# Patient Record
Sex: MALE | Race: WHITE | Employment: FULL TIME | URBAN - METROPOLITAN AREA
[De-identification: names, ages, dates, MRNs, and addresses within clinical notes are randomized per-mention and may not be internally consistent; named-entity substitution may affect disease eponyms.]

---

## 2017-06-30 ENCOUNTER — TELEPHONE (OUTPATIENT)
Dept: INTERNAL MEDICINE CLINIC | Facility: CLINIC | Age: 62
End: 2017-06-30

## 2017-06-30 DIAGNOSIS — Z13.220 SCREENING FOR LIPOID DISORDERS: ICD-10-CM

## 2017-06-30 DIAGNOSIS — Z13.0 SCREENING FOR BLOOD DISEASE: ICD-10-CM

## 2017-06-30 DIAGNOSIS — Z13.228 SCREENING FOR METABOLIC DISORDER: ICD-10-CM

## 2017-06-30 DIAGNOSIS — Z12.5 SCREENING FOR PROSTATE CANCER: ICD-10-CM

## 2017-06-30 DIAGNOSIS — Z13.29 SCREENING FOR THYROID DISORDER: ICD-10-CM

## 2017-06-30 DIAGNOSIS — Z00.00 ROUTINE GENERAL MEDICAL EXAMINATION AT A HEALTH CARE FACILITY: Primary | ICD-10-CM

## 2017-08-24 ENCOUNTER — LAB ENCOUNTER (OUTPATIENT)
Dept: LAB | Facility: HOSPITAL | Age: 62
End: 2017-08-24
Attending: INTERNAL MEDICINE
Payer: COMMERCIAL

## 2017-08-24 DIAGNOSIS — Z12.5 SCREENING FOR PROSTATE CANCER: ICD-10-CM

## 2017-08-24 DIAGNOSIS — Z00.00 ROUTINE GENERAL MEDICAL EXAMINATION AT A HEALTH CARE FACILITY: ICD-10-CM

## 2017-08-24 DIAGNOSIS — Z13.228 SCREENING FOR METABOLIC DISORDER: ICD-10-CM

## 2017-08-24 DIAGNOSIS — Z13.0 SCREENING FOR BLOOD DISEASE: ICD-10-CM

## 2017-08-24 DIAGNOSIS — Z13.29 SCREENING FOR THYROID DISORDER: ICD-10-CM

## 2017-08-24 DIAGNOSIS — Z13.220 SCREENING FOR LIPOID DISORDERS: ICD-10-CM

## 2017-08-24 LAB
ALBUMIN SERPL-MCNC: 3.7 G/DL (ref 3.5–4.8)
ALP LIVER SERPL-CCNC: 67 U/L (ref 45–117)
ALT SERPL-CCNC: 30 U/L (ref 17–63)
AST SERPL-CCNC: 15 U/L (ref 15–41)
BASOPHILS # BLD AUTO: 0.05 X10(3) UL (ref 0–0.1)
BASOPHILS NFR BLD AUTO: 0.7 %
BILIRUB SERPL-MCNC: 0.7 MG/DL (ref 0.1–2)
BUN BLD-MCNC: 14 MG/DL (ref 8–20)
CALCIUM BLD-MCNC: 8.8 MG/DL (ref 8.3–10.3)
CHLORIDE: 102 MMOL/L (ref 101–111)
CHOLEST SMN-MCNC: 188 MG/DL (ref ?–200)
CO2: 29 MMOL/L (ref 22–32)
COMPLEXED PSA SERPL-MCNC: 1.99 NG/ML (ref 0.01–4)
CREAT BLD-MCNC: 0.98 MG/DL (ref 0.7–1.3)
EOSINOPHIL # BLD AUTO: 0.21 X10(3) UL (ref 0–0.3)
EOSINOPHIL NFR BLD AUTO: 2.9 %
ERYTHROCYTE [DISTWIDTH] IN BLOOD BY AUTOMATED COUNT: 12.4 % (ref 11.5–16)
GLUCOSE BLD-MCNC: 149 MG/DL (ref 70–99)
HCT VFR BLD AUTO: 42.5 % (ref 37–53)
HDLC SERPL-MCNC: 41 MG/DL (ref 45–?)
HDLC SERPL: 4.59 {RATIO} (ref ?–4.97)
HGB BLD-MCNC: 14.2 G/DL (ref 13–17)
IMMATURE GRANULOCYTE COUNT: 0.07 X10(3) UL (ref 0–1)
IMMATURE GRANULOCYTE RATIO %: 1 %
LDLC SERPL CALC-MCNC: 126 MG/DL (ref ?–130)
LDLC SERPL-MCNC: 21 MG/DL (ref 5–40)
LYMPHOCYTES # BLD AUTO: 2.06 X10(3) UL (ref 0.9–4)
LYMPHOCYTES NFR BLD AUTO: 28.2 %
M PROTEIN MFR SERPL ELPH: 7.3 G/DL (ref 6.1–8.3)
MCH RBC QN AUTO: 29.8 PG (ref 27–33.2)
MCHC RBC AUTO-ENTMCNC: 33.4 G/DL (ref 31–37)
MCV RBC AUTO: 89.3 FL (ref 80–99)
MONOCYTES # BLD AUTO: 0.66 X10(3) UL (ref 0.1–0.6)
MONOCYTES NFR BLD AUTO: 9 %
NEUTROPHIL ABS PRELIM: 4.26 X10 (3) UL (ref 1.3–6.7)
NEUTROPHILS # BLD AUTO: 4.26 X10(3) UL (ref 1.3–6.7)
NEUTROPHILS NFR BLD AUTO: 58.2 %
NONHDLC SERPL-MCNC: 147 MG/DL (ref ?–130)
PLATELET # BLD AUTO: 261 10(3)UL (ref 150–450)
POTASSIUM SERPL-SCNC: 4 MMOL/L (ref 3.6–5.1)
RBC # BLD AUTO: 4.76 X10(6)UL (ref 4.3–5.7)
RED CELL DISTRIBUTION WIDTH-SD: 41.3 FL (ref 35.1–46.3)
SODIUM SERPL-SCNC: 137 MMOL/L (ref 136–144)
TRIGLYCERIDES: 104 MG/DL (ref ?–150)
TSI SER-ACNC: 1.78 MIU/ML (ref 0.35–5.5)
WBC # BLD AUTO: 7.3 X10(3) UL (ref 4–13)

## 2017-08-24 PROCEDURE — 80061 LIPID PANEL: CPT

## 2017-08-24 PROCEDURE — 85025 COMPLETE CBC W/AUTO DIFF WBC: CPT

## 2017-08-24 PROCEDURE — 84443 ASSAY THYROID STIM HORMONE: CPT

## 2017-08-24 PROCEDURE — 80053 COMPREHEN METABOLIC PANEL: CPT

## 2017-08-24 PROCEDURE — 36415 COLL VENOUS BLD VENIPUNCTURE: CPT

## 2017-08-29 ENCOUNTER — OFFICE VISIT (OUTPATIENT)
Dept: INTERNAL MEDICINE CLINIC | Facility: CLINIC | Age: 62
End: 2017-08-29

## 2017-08-29 VITALS
WEIGHT: 170.63 LBS | SYSTOLIC BLOOD PRESSURE: 124 MMHG | OXYGEN SATURATION: 98 % | HEIGHT: 67 IN | TEMPERATURE: 98 F | BODY MASS INDEX: 26.78 KG/M2 | RESPIRATION RATE: 17 BRPM | DIASTOLIC BLOOD PRESSURE: 68 MMHG | HEART RATE: 82 BPM

## 2017-08-29 DIAGNOSIS — Z00.00 PE (PHYSICAL EXAM), ANNUAL: Primary | ICD-10-CM

## 2017-08-29 DIAGNOSIS — K40.90 LEFT INGUINAL HERNIA: ICD-10-CM

## 2017-08-29 DIAGNOSIS — R73.9 HYPERGLYCEMIA: ICD-10-CM

## 2017-08-29 PROCEDURE — 99396 PREV VISIT EST AGE 40-64: CPT | Performed by: INTERNAL MEDICINE

## 2017-08-29 NOTE — PROGRESS NOTES
Patient presents with:  Physical: no concerns. RM 9       HPI:  Here for cpe. Pt with high sugar on labs, 140, overdue for cpe. Pt has no complaints. Had c-scope with [facility].      Review of Systems   Constitutional: Negative for fever, chills and fatig distress. HEENT:  Normocephalic and atraumatic. Hearing and tympanic membranes normal.  Nose normal. Oropharynx is clear and moist.   Eyes: Conjunctivae and EOM are normal. PERRLA. No scleral icterus. Neck: Normal range of motion. Neck supple.  Normal c

## 2017-09-01 ENCOUNTER — APPOINTMENT (OUTPATIENT)
Dept: LAB | Facility: HOSPITAL | Age: 62
End: 2017-09-01
Attending: INTERNAL MEDICINE
Payer: COMMERCIAL

## 2017-09-01 DIAGNOSIS — R73.9 HYPERGLYCEMIA: ICD-10-CM

## 2017-09-01 LAB
EST. AVERAGE GLUCOSE BLD GHB EST-MCNC: 151 MG/DL (ref 68–126)
HBA1C MFR BLD HPLC: 6.9 % (ref ?–5.7)

## 2017-09-01 PROCEDURE — 36415 COLL VENOUS BLD VENIPUNCTURE: CPT

## 2017-09-01 PROCEDURE — 83036 HEMOGLOBIN GLYCOSYLATED A1C: CPT

## 2017-10-06 ENCOUNTER — OFFICE VISIT (OUTPATIENT)
Dept: INTERNAL MEDICINE CLINIC | Facility: CLINIC | Age: 62
End: 2017-10-06

## 2017-10-06 VITALS
RESPIRATION RATE: 16 BRPM | DIASTOLIC BLOOD PRESSURE: 62 MMHG | WEIGHT: 164 LBS | HEART RATE: 80 BPM | HEIGHT: 67 IN | SYSTOLIC BLOOD PRESSURE: 120 MMHG | BODY MASS INDEX: 25.74 KG/M2

## 2017-10-06 DIAGNOSIS — E11.9 TYPE 2 DIABETES MELLITUS WITHOUT COMPLICATION, WITHOUT LONG-TERM CURRENT USE OF INSULIN (HCC): Primary | ICD-10-CM

## 2017-10-06 PROCEDURE — 99214 OFFICE O/P EST MOD 30 MIN: CPT | Performed by: INTERNAL MEDICINE

## 2017-10-09 NOTE — PROGRESS NOTES
Patient presents with:  Lab Results: a1c 6.9      HPI:  Here for f/u hyperglycemia, pt with a1c 6.9, diabetic, but not uncontrolled. Eating high carb diet right now. Not exercising much. No other complaints or symptoms.      Review of Systems   No f/c/chest RATIO, RANDOM URINE    Meds & Refills for this Visit:  Signed Prescriptions Disp Refills    MetFORMIN HCl 500 MG Oral Tab 30 tablet 3      Sig: Take 1 tablet (500 mg total) by mouth daily with breakfast.           Imaging & Consults:  OP REFERRAL NEW ONSET

## 2017-10-10 ENCOUNTER — TELEPHONE (OUTPATIENT)
Dept: ENDOCRINOLOGY CLINIC | Facility: CLINIC | Age: 62
End: 2017-10-10

## 2017-10-24 ENCOUNTER — NURSE ONLY (OUTPATIENT)
Dept: ENDOCRINOLOGY CLINIC | Facility: CLINIC | Age: 62
End: 2017-10-24

## 2017-10-24 VITALS
WEIGHT: 168 LBS | BODY MASS INDEX: 26.37 KG/M2 | DIASTOLIC BLOOD PRESSURE: 75 MMHG | HEART RATE: 88 BPM | SYSTOLIC BLOOD PRESSURE: 131 MMHG | HEIGHT: 67 IN

## 2017-10-24 DIAGNOSIS — E11.65 TYPE 2 DIABETES MELLITUS WITH HYPERGLYCEMIA, WITHOUT LONG-TERM CURRENT USE OF INSULIN (HCC): Primary | ICD-10-CM

## 2017-10-24 PROCEDURE — G0108 DIAB MANAGE TRN  PER INDIV: HCPCS

## 2017-10-24 NOTE — PROGRESS NOTES
Maile Cabrera  : 1955 attended Step 1 Diabetic Education:    Date: 10/24/2017       /75   Pulse 88   Ht 67\"   Wt 168 lb   BMI 26.31 kg/m²       HGBA1C (%)   Date Value   2012 6.2 (H)   ----------  HgbA1C (%)   Date Value   2017 6. 9

## 2017-10-26 ENCOUNTER — OFFICE VISIT (OUTPATIENT)
Dept: SURGERY | Facility: CLINIC | Age: 62
End: 2017-10-26

## 2017-10-26 VITALS
DIASTOLIC BLOOD PRESSURE: 82 MMHG | BODY MASS INDEX: 26.68 KG/M2 | SYSTOLIC BLOOD PRESSURE: 143 MMHG | HEART RATE: 79 BPM | HEIGHT: 67 IN | WEIGHT: 170 LBS | TEMPERATURE: 97 F

## 2017-10-26 DIAGNOSIS — K40.90 LEFT INGUINAL HERNIA: Primary | ICD-10-CM

## 2017-10-26 PROCEDURE — 99244 OFF/OP CNSLTJ NEW/EST MOD 40: CPT | Performed by: COLON & RECTAL SURGERY

## 2017-10-26 NOTE — PATIENT INSTRUCTIONS
Assessment   Left inguinal hernia  (primary encounter diagnosis)    Patient has an initial reducible left inguinal hernia. No evidence of strength relation or incarceration. Plan   We will schedule patient for laparoscopic inguinal hernia repair.   We w

## 2017-10-26 NOTE — H&P
New Patient Visit Note       Active Problems      1.  Left inguinal hernia        Chief Complaint   Left groin hernia    History of Present Illness   Le Romero is a 19-year-old gentleman referred by Dr. Huma Whittington for evaluation of a left inguinal he Smokeless tobacco: Never Used                        Alcohol use: No                 Comment: Occasional beer    Drug use:  No              Sexual activity: Yes                       Current Outpatient Prescriptions:  Glucose Blood (KAEL CONTOUR Normal rate, Regular rhythm, no murmurs  Respiratory: No respiratory distress, breath sounds clear bilaterally  Abdominal: Soft, Nontender, Nondistended, mild diastasis recti, no umbilical hernia. In the supine position the patient's groin was examined. postoperative pain, urinary retention, injury to adjacent organs and structures, injury to the ilioinguinal and iliohypogastric nerve, testicular ischemia leading to ischemic orchitis or testicular atrophy, injury to the vas deferens, as well as potential

## 2017-11-02 ENCOUNTER — TELEPHONE (OUTPATIENT)
Dept: ENDOCRINOLOGY CLINIC | Facility: CLINIC | Age: 62
End: 2017-11-02

## 2017-11-02 ENCOUNTER — TELEPHONE (OUTPATIENT)
Dept: INTERNAL MEDICINE CLINIC | Facility: CLINIC | Age: 62
End: 2017-11-02

## 2017-11-02 NOTE — TELEPHONE ENCOUNTER
Pt called to say he did not receive his medications from [facility], I asked him which ones and Pt did not know. Stated he was not at home and did not know the name of the scripts.  I put Pt on hold and called [facility] to verify what had or had not been picked

## 2017-11-02 NOTE — TELEPHONE ENCOUNTER
[facility] called to request new Rx for test strips and lancets. Pt was given Justin meter at office visit on 10/24. Insurance covers One Touch Meter.  Shelli Duffy gave verbal order over the phone for the One Touch Verio Meter, test strips and lancets for patient 1

## 2017-11-07 ENCOUNTER — DIABETIC EDUCATION (OUTPATIENT)
Dept: ENDOCRINOLOGY CLINIC | Facility: CLINIC | Age: 62
End: 2017-11-07

## 2017-11-07 ENCOUNTER — TELEPHONE (OUTPATIENT)
Dept: ENDOCRINOLOGY CLINIC | Facility: CLINIC | Age: 62
End: 2017-11-07

## 2017-11-07 DIAGNOSIS — R73.9 HYPERGLYCEMIA: Primary | ICD-10-CM

## 2017-11-07 PROCEDURE — G0109 DIAB MANAGE TRN IND/GROUP: HCPCS | Performed by: DIETITIAN, REGISTERED

## 2017-11-07 RX ORDER — BLOOD SUGAR DIAGNOSTIC
STRIP MISCELLANEOUS
Qty: 100 STRIP | Refills: 1 | Status: SHIPPED | OUTPATIENT
Start: 2017-11-07 | End: 2018-11-07

## 2017-11-07 RX ORDER — LANCETS 33 GAUGE
1 EACH MISCELLANEOUS DAILY
Qty: 1 BOX | Refills: 0 | Status: SHIPPED | OUTPATIENT
Start: 2017-11-07 | End: 2018-11-07

## 2017-11-08 NOTE — PROGRESS NOTES
Mookie Armstrong  [id number] attended Step 2 Diabetic Education:    Date: 11/7/2017  Start time: 6:00 End time: 8:00    Diabetes Overview, pathophysiology, pre-diabetes, A1C results and treatment options for diabetes self-management, types of diabetes and ris

## 2017-11-14 ENCOUNTER — DIABETIC EDUCATION (OUTPATIENT)
Dept: ENDOCRINOLOGY CLINIC | Facility: CLINIC | Age: 62
End: 2017-11-14

## 2017-11-14 DIAGNOSIS — R73.9 HYPERGLYCEMIA: Primary | ICD-10-CM

## 2017-11-14 PROCEDURE — G0109 DIAB MANAGE TRN IND/GROUP: HCPCS | Performed by: DIETITIAN, REGISTERED

## 2017-11-15 NOTE — PROGRESS NOTES
Le GONZALEZ 1955 attended Step 3 Diabetic Education:    Date: 2017  Start time: 6:00 End time: 8:00    Prevention, detection and treatment of chronic complications  Reviewed how to reduce risks of complications including eye, foot, dental,

## 2018-02-12 ENCOUNTER — MED REC SCAN ONLY (OUTPATIENT)
Dept: INTERNAL MEDICINE CLINIC | Facility: CLINIC | Age: 63
End: 2018-02-12

## 2018-02-20 DIAGNOSIS — Z12.11 SCREENING FOR COLON CANCER: Primary | ICD-10-CM

## 2018-02-20 NOTE — PROGRESS NOTES
Patient is over due for colonoscopy or does not have records in [place]. Has he had one, if so we need records. If not I can place referral for him. Please let me know. Thanks.

## 2018-03-07 ENCOUNTER — OFFICE VISIT (OUTPATIENT)
Dept: ENDOCRINOLOGY CLINIC | Facility: CLINIC | Age: 63
End: 2018-03-07

## 2018-03-07 VITALS
HEIGHT: 67 IN | RESPIRATION RATE: 16 BRPM | TEMPERATURE: 98 F | DIASTOLIC BLOOD PRESSURE: 62 MMHG | BODY MASS INDEX: 26.18 KG/M2 | SYSTOLIC BLOOD PRESSURE: 120 MMHG | WEIGHT: 166.81 LBS | HEART RATE: 88 BPM

## 2018-03-07 DIAGNOSIS — E11.9 TYPE 2 DIABETES MELLITUS WITHOUT COMPLICATION, WITHOUT LONG-TERM CURRENT USE OF INSULIN (HCC): Primary | ICD-10-CM

## 2018-03-07 LAB
CARTRIDGE LOT#: 806 NUMERIC
HEMOGLOBIN A1C: 6.6 % (ref 4.3–5.6)

## 2018-03-07 PROCEDURE — 99213 OFFICE O/P EST LOW 20 MIN: CPT | Performed by: INTERNAL MEDICINE

## 2018-03-07 PROCEDURE — 90732 PPSV23 VACC 2 YRS+ SUBQ/IM: CPT | Performed by: INTERNAL MEDICINE

## 2018-03-07 PROCEDURE — 90471 IMMUNIZATION ADMIN: CPT | Performed by: INTERNAL MEDICINE

## 2018-03-07 PROCEDURE — 83036 HEMOGLOBIN GLYCOSYLATED A1C: CPT | Performed by: INTERNAL MEDICINE

## 2018-03-07 NOTE — PROGRESS NOTES
Recommendation: get labs done before NOV, schedule Step 4 class with CM to complete DB education initiated in the fall    A1C: A1C today 6.6%, improved from 6.9% 9/2017    Diet: reviewed with pt, reasonable    Ex: [employer] 2-3 days/week.  [place]

## 2018-03-07 NOTE — PROGRESS NOTES
Patient presents with:  Diabetes: diabetic f/u       HPI:  Here for f/u dm2, stable, well controlled on meds, no se's. Due for labs. Review of Systems   No f/c/chest pain or sob. No cough. No abd pain/n/v/d. No ha or dizziness.  No numbness, tingling, o continue meds  See me ini 6 mos for f/u    Orders Placed This Encounter      COMP METABOLIC PANEL      LIPID PANEL      MICROALB/CREAT RATIO, RANDOM URINE      Hgb A1C      Pneumococcal 23, Adult (Pneumovax) (99354) (Dx V03.82)    Meds & Refills for this V

## 2018-04-07 ENCOUNTER — LAB ENCOUNTER (OUTPATIENT)
Dept: LAB | Facility: HOSPITAL | Age: 63
End: 2018-04-07
Attending: INTERNAL MEDICINE
Payer: COMMERCIAL

## 2018-04-07 DIAGNOSIS — E11.9 TYPE 2 DIABETES MELLITUS WITHOUT COMPLICATION, WITHOUT LONG-TERM CURRENT USE OF INSULIN (HCC): ICD-10-CM

## 2018-04-07 PROCEDURE — 83036 HEMOGLOBIN GLYCOSYLATED A1C: CPT

## 2018-04-07 PROCEDURE — 82570 ASSAY OF URINE CREATININE: CPT

## 2018-04-07 PROCEDURE — 82043 UR ALBUMIN QUANTITATIVE: CPT

## 2018-04-07 PROCEDURE — 36415 COLL VENOUS BLD VENIPUNCTURE: CPT

## 2018-04-07 PROCEDURE — 80053 COMPREHEN METABOLIC PANEL: CPT

## 2018-04-07 PROCEDURE — 80061 LIPID PANEL: CPT

## 2018-08-21 ENCOUNTER — TELEPHONE (OUTPATIENT)
Dept: INTERNAL MEDICINE CLINIC | Facility: CLINIC | Age: 63
End: 2018-08-21

## 2018-08-21 DIAGNOSIS — Z13.29 SCREENING FOR THYROID DISORDER: ICD-10-CM

## 2018-08-21 DIAGNOSIS — Z00.00 ROUTINE GENERAL MEDICAL EXAMINATION AT A HEALTH CARE FACILITY: Primary | ICD-10-CM

## 2018-08-21 DIAGNOSIS — Z13.220 SCREENING FOR LIPOID DISORDERS: ICD-10-CM

## 2018-08-21 DIAGNOSIS — Z13.0 SCREENING FOR BLOOD DISEASE: ICD-10-CM

## 2018-08-21 DIAGNOSIS — Z12.5 SCREENING FOR PROSTATE CANCER: ICD-10-CM

## 2018-08-21 DIAGNOSIS — Z13.228 SCREENING FOR METABOLIC DISORDER: ICD-10-CM

## 2018-08-21 NOTE — TELEPHONE ENCOUNTER
Future Appointments  Date Time Provider Martinez Pettit   9/4/2018 3:55 PM Lowell Nam DPM [id number]   10/8/2018 4:00 PM Sherri Wilson MD [facility]     Orders to edward- Pt aware to fast-no call back required

## 2018-09-04 ENCOUNTER — OFFICE VISIT (OUTPATIENT)
Dept: PODIATRY CLINIC | Facility: CLINIC | Age: 63
End: 2018-09-04
Payer: COMMERCIAL

## 2018-09-04 DIAGNOSIS — B35.1 ONYCHOMYCOSIS: Primary | ICD-10-CM

## 2018-09-04 DIAGNOSIS — B35.3 TINEA PEDIS OF BOTH FEET: ICD-10-CM

## 2018-09-04 PROCEDURE — 99203 OFFICE O/P NEW LOW 30 MIN: CPT | Performed by: PODIATRIST

## 2018-09-06 NOTE — PROGRESS NOTES
Guillermo Mallory is a 66year old male. Patient presents with:  Consult: toenail fungus -- onset about 6-7 mths ago.  Tried OTC topical.         HPI:   This pleasant gentleman presents to the clinic he is diabetic patient currently on metformin trying to get of Exam  GENERAL: well developed, well nourished, in no apparent distress  EXTREMITIES:   1. Integument: The skin on both feet was examined it is warm and dry his nails 1-5 on both feet are thickened and dystrophic.   The specimen was taken of the right hallux

## 2018-09-26 ENCOUNTER — TELEPHONE (OUTPATIENT)
Dept: PODIATRY CLINIC | Facility: CLINIC | Age: 63
End: 2018-09-26

## 2018-09-27 NOTE — TELEPHONE ENCOUNTER
Dr. Samina Bridges, per your notes for test results \"Tests show no significant abnormalities. \" Do you have any further instructions for pt?

## 2018-10-02 ENCOUNTER — TELEPHONE (OUTPATIENT)
Dept: PODIATRY CLINIC | Facility: CLINIC | Age: 63
End: 2018-10-02

## 2018-10-02 NOTE — TELEPHONE ENCOUNTER
Call to [address], [facility]/s  No answer Left voice message.  REquesting call back

## 2018-10-03 NOTE — TELEPHONE ENCOUNTER
Pt is returning the call. Called the office but was asked to send a message. Pt does not understand why the doctor is not calling the pt back.   Call

## 2018-10-03 NOTE — TELEPHONE ENCOUNTER
Call to [address], [facility]/s  No answer. Left voice message.  REquesting call back

## 2018-10-08 ENCOUNTER — OFFICE VISIT (OUTPATIENT)
Dept: INTERNAL MEDICINE CLINIC | Facility: CLINIC | Age: 63
End: 2018-10-08
Payer: COMMERCIAL

## 2018-10-08 VITALS
HEART RATE: 84 BPM | WEIGHT: 166 LBS | HEIGHT: 67 IN | SYSTOLIC BLOOD PRESSURE: 132 MMHG | DIASTOLIC BLOOD PRESSURE: 78 MMHG | BODY MASS INDEX: 26.06 KG/M2 | RESPIRATION RATE: 14 BRPM | TEMPERATURE: 98 F

## 2018-10-08 DIAGNOSIS — Z00.00 PE (PHYSICAL EXAM), ANNUAL: Primary | ICD-10-CM

## 2018-10-08 DIAGNOSIS — R73.9 HYPERGLYCEMIA: ICD-10-CM

## 2018-10-08 DIAGNOSIS — K40.90 LEFT INGUINAL HERNIA: ICD-10-CM

## 2018-10-08 DIAGNOSIS — Z23 NEEDS FLU SHOT: ICD-10-CM

## 2018-10-08 DIAGNOSIS — Z12.11 SCREEN FOR COLON CANCER: ICD-10-CM

## 2018-10-08 PROCEDURE — 90471 IMMUNIZATION ADMIN: CPT | Performed by: INTERNAL MEDICINE

## 2018-10-08 PROCEDURE — 99396 PREV VISIT EST AGE 40-64: CPT | Performed by: INTERNAL MEDICINE

## 2018-10-08 PROCEDURE — 90686 IIV4 VACC NO PRSV 0.5 ML IM: CPT | Performed by: INTERNAL MEDICINE

## 2018-10-08 NOTE — PROGRESS NOTES
Patient presents with:  Physical: LB-rm 7      HPI:  Here for cpe, has borderline blood sugars, though on metformin. Feels well. No complaints. Due for labs. Never got inguinal hernia repaired. Review of Systems   No f/c/chest pain or sob. No cough.  No present    A/P:    Needs flu shot  Hyperglycemia  Screen for colon cancer  Left inguinal hernia  Pe (physical exam), annual  (primary encounter diagnosis)  Orders Placed This Encounter      Hemoglobin A1C [E]      Flulaval 6 months and older 0.5 ml Quad PF

## 2018-11-05 NOTE — TELEPHONE ENCOUNTER
Did not pass protocol-pt due for A1c   Lab orders were placed at [address] on 10/8/18  Per protocol routed to provider

## 2018-11-15 NOTE — TELEPHONE ENCOUNTER
insurance does not cover 30 days only 90 days     Protocol failed due to HgBA1C procedure resulted in past 6 months,Last HgBA1C < 7.5  Please advise,    LOV: 10/8/18 AS  FOV:none on file   LAST RX:11/5/18 500 mg take 1 tab daily 30 tabs 0 refills   LAST LA

## 2018-12-08 ENCOUNTER — LAB ENCOUNTER (OUTPATIENT)
Dept: LAB | Facility: HOSPITAL | Age: 63
End: 2018-12-08
Attending: INTERNAL MEDICINE
Payer: COMMERCIAL

## 2018-12-08 DIAGNOSIS — Z13.0 SCREENING FOR BLOOD DISEASE: ICD-10-CM

## 2018-12-08 DIAGNOSIS — Z12.5 SCREENING FOR PROSTATE CANCER: ICD-10-CM

## 2018-12-08 DIAGNOSIS — Z00.00 ROUTINE GENERAL MEDICAL EXAMINATION AT A HEALTH CARE FACILITY: ICD-10-CM

## 2018-12-08 DIAGNOSIS — R73.9 HYPERGLYCEMIA: ICD-10-CM

## 2018-12-08 DIAGNOSIS — Z13.29 SCREENING FOR THYROID DISORDER: ICD-10-CM

## 2018-12-08 DIAGNOSIS — Z13.228 SCREENING FOR METABOLIC DISORDER: ICD-10-CM

## 2018-12-08 DIAGNOSIS — Z13.220 SCREENING FOR LIPOID DISORDERS: ICD-10-CM

## 2018-12-08 PROCEDURE — 80061 LIPID PANEL: CPT

## 2018-12-08 PROCEDURE — 84443 ASSAY THYROID STIM HORMONE: CPT

## 2018-12-08 PROCEDURE — 85025 COMPLETE CBC W/AUTO DIFF WBC: CPT

## 2018-12-08 PROCEDURE — 36415 COLL VENOUS BLD VENIPUNCTURE: CPT

## 2018-12-08 PROCEDURE — 83036 HEMOGLOBIN GLYCOSYLATED A1C: CPT

## 2018-12-08 PROCEDURE — 80053 COMPREHEN METABOLIC PANEL: CPT

## 2018-12-11 RX ORDER — BLOOD SUGAR DIAGNOSTIC
STRIP MISCELLANEOUS
Qty: 100 STRIP | Refills: 1 | Status: SHIPPED | OUTPATIENT
Start: 2018-12-11 | End: 2019-01-29

## 2018-12-12 PROCEDURE — 88305 TISSUE EXAM BY PATHOLOGIST: CPT | Performed by: INTERNAL MEDICINE

## 2019-01-29 ENCOUNTER — OFFICE VISIT (OUTPATIENT)
Dept: INTERNAL MEDICINE CLINIC | Facility: CLINIC | Age: 64
End: 2019-01-29
Payer: COMMERCIAL

## 2019-01-29 VITALS
WEIGHT: 176 LBS | DIASTOLIC BLOOD PRESSURE: 74 MMHG | BODY MASS INDEX: 27.62 KG/M2 | SYSTOLIC BLOOD PRESSURE: 134 MMHG | HEART RATE: 80 BPM | TEMPERATURE: 98 F | HEIGHT: 67 IN | RESPIRATION RATE: 16 BRPM

## 2019-01-29 DIAGNOSIS — R73.9 HYPERGLYCEMIA: Primary | ICD-10-CM

## 2019-01-29 PROCEDURE — 99213 OFFICE O/P EST LOW 20 MIN: CPT | Performed by: INTERNAL MEDICINE

## 2019-01-29 RX ORDER — METFORMIN HYDROCHLORIDE 1000 MG/1
1000 TABLET, FILM COATED, EXTENDED RELEASE ORAL
Qty: 90 TABLET | Refills: 3 | Status: SHIPPED | OUTPATIENT
Start: 2019-01-29 | End: 2019-02-07

## 2019-01-29 RX ORDER — BLOOD SUGAR DIAGNOSTIC
STRIP MISCELLANEOUS
Qty: 100 STRIP | Refills: 3 | Status: SHIPPED | OUTPATIENT
Start: 2019-01-29 | End: 2019-10-03

## 2019-01-30 NOTE — PROGRESS NOTES
Guillermo Mallory  8/9/1955    Patient presents with: Follow - Up: to review labs. LB-rm 7      SUBJECTIVE   Guillermo Mallory is a 66year old male who presents as a laboratory follow-up.     The patient has a history of hyperglycemia/glucose intolerance managed wi time. No distress. HEENT:  Normocephalic and atraumatic. Cardiovascular: Normal rate, regular rhythm and intact distal pulses. No murmur, rubs or gallops. Pulmonary/Chest: Effort normal and breath sounds normal. No respiratory distress.   Musculoskele

## 2019-02-05 ENCOUNTER — TELEPHONE (OUTPATIENT)
Dept: INTERNAL MEDICINE CLINIC | Facility: CLINIC | Age: 64
End: 2019-02-05

## 2019-02-06 ENCOUNTER — TELEPHONE (OUTPATIENT)
Dept: INTERNAL MEDICINE CLINIC | Facility: CLINIC | Age: 64
End: 2019-02-06

## 2019-02-06 NOTE — TELEPHONE ENCOUNTER
Received request for prior authorization for Metformin ER 1000 from [facility], [facility], fax:[phone]     Ph:[phone]  Reference # [id number]  Fax ID# [phone]    Initiate prior auth? Please advise.

## 2019-02-07 RX ORDER — METFORMIN HYDROCHLORIDE 500 MG/1
1000 TABLET, EXTENDED RELEASE ORAL
Qty: 90 TABLET | Refills: 3 | Status: SHIPPED | OUTPATIENT
Start: 2019-02-07 | End: 2019-10-01

## 2019-02-07 NOTE — TELEPHONE ENCOUNTER
No need to pursue PA. Prescribed metformin  mg x 2 tabs daily with breakfast to mail order. This should be covered.

## 2019-03-08 NOTE — TELEPHONE ENCOUNTER
Refill was sent on 2/7/19 qt:90 w/ 3 refills to [facility] too soon for refill.

## 2019-09-30 NOTE — TELEPHONE ENCOUNTER
Last VISIT 01/29/19  Last REFILL 02/07/19 QTY 90 w/3 refills  Last LABS 12/8/18 was 6/6  No future appointments.

## 2019-10-01 RX ORDER — METFORMIN HYDROCHLORIDE 500 MG/1
1000 TABLET, EXTENDED RELEASE ORAL
Qty: 90 TABLET | Refills: 3 | Status: SHIPPED | OUTPATIENT
Start: 2019-10-01 | End: 2020-08-06

## 2019-10-02 RX ORDER — BLOOD SUGAR DIAGNOSTIC
STRIP MISCELLANEOUS
Qty: 100 STRIP | Refills: 1 | Status: SHIPPED | OUTPATIENT
Start: 2019-10-02 | End: 2019-10-03

## 2019-10-03 ENCOUNTER — TELEPHONE (OUTPATIENT)
Dept: INTERNAL MEDICINE CLINIC | Facility: CLINIC | Age: 64
End: 2019-10-03

## 2019-10-03 DIAGNOSIS — R73.9 HYPERGLYCEMIA: Primary | ICD-10-CM

## 2019-10-03 RX ORDER — BLOOD-GLUCOSE METER
1 EACH MISCELLANEOUS ONCE
Qty: 1 KIT | Refills: 0 | Status: SHIPPED | OUTPATIENT
Start: 2019-10-03 | End: 2019-10-03

## 2019-10-03 RX ORDER — LANCETS
1 EACH MISCELLANEOUS DAILY
Qty: 100 EACH | Refills: 1 | Status: SHIPPED | OUTPATIENT
Start: 2019-10-03 | End: 2020-10-02

## 2019-10-03 RX ORDER — BLOOD SUGAR DIAGNOSTIC
1 STRIP MISCELLANEOUS DAILY
Qty: 100 STRIP | Refills: 1 | Status: SHIPPED | OUTPATIENT
Start: 2019-10-03 | End: 2020-12-18

## 2019-10-03 NOTE — TELEPHONE ENCOUNTER
Fax received from [facility] stating patient insurance does not cover One Touch Verio Test Strips. Requesting alternative be sent for patient. Pharmacy recommended Accucheck Guide test strips. Pended order to be approved or denied.

## 2019-10-09 ENCOUNTER — TELEPHONE (OUTPATIENT)
Dept: INTERNAL MEDICINE CLINIC | Facility: CLINIC | Age: 64
End: 2019-10-09

## 2019-10-09 DIAGNOSIS — Z13.29 SCREENING FOR ENDOCRINE, METABOLIC AND IMMUNITY DISORDER: ICD-10-CM

## 2019-10-09 DIAGNOSIS — Z13.0 SCREENING FOR ENDOCRINE, METABOLIC AND IMMUNITY DISORDER: ICD-10-CM

## 2019-10-09 DIAGNOSIS — Z00.00 ROUTINE GENERAL MEDICAL EXAMINATION AT A HEALTH CARE FACILITY: Primary | ICD-10-CM

## 2019-10-09 DIAGNOSIS — Z12.5 SCREENING FOR PROSTATE CANCER: ICD-10-CM

## 2019-10-09 DIAGNOSIS — Z13.0 SCREENING FOR DISORDER OF BLOOD AND BLOOD-FORMING ORGANS: ICD-10-CM

## 2019-10-09 DIAGNOSIS — Z13.29 SCREENING FOR THYROID DISORDER: ICD-10-CM

## 2019-10-09 DIAGNOSIS — Z13.220 ENCOUNTER FOR SCREENING FOR LIPID DISORDER: ICD-10-CM

## 2019-10-09 DIAGNOSIS — Z13.228 SCREENING FOR ENDOCRINE, METABOLIC AND IMMUNITY DISORDER: ICD-10-CM

## 2019-10-09 DIAGNOSIS — Z13.29 THYROID DISORDER SCREENING: ICD-10-CM

## 2019-10-09 NOTE — TELEPHONE ENCOUNTER
Future Appointments   Date Time Provider Martinez Pettit   11/18/2019  3:30 PM Talbert Simmonds, MD [facility]     Orders to edward- Pt aware to fast-no call back required

## 2019-11-18 ENCOUNTER — OFFICE VISIT (OUTPATIENT)
Dept: INTERNAL MEDICINE CLINIC | Facility: CLINIC | Age: 64
End: 2019-11-18
Payer: COMMERCIAL

## 2019-11-18 VITALS
SYSTOLIC BLOOD PRESSURE: 120 MMHG | HEIGHT: 66.14 IN | RESPIRATION RATE: 16 BRPM | BODY MASS INDEX: 25.88 KG/M2 | OXYGEN SATURATION: 98 % | DIASTOLIC BLOOD PRESSURE: 68 MMHG | HEART RATE: 78 BPM | WEIGHT: 161 LBS | TEMPERATURE: 99 F

## 2019-11-18 DIAGNOSIS — Z00.00 PE (PHYSICAL EXAM), ANNUAL: Primary | ICD-10-CM

## 2019-11-18 DIAGNOSIS — E11.9 TYPE 2 DIABETES MELLITUS WITHOUT COMPLICATION, WITHOUT LONG-TERM CURRENT USE OF INSULIN (HCC): ICD-10-CM

## 2019-11-18 DIAGNOSIS — R73.9 HYPERGLYCEMIA: ICD-10-CM

## 2019-11-18 DIAGNOSIS — K40.90 LEFT INGUINAL HERNIA: ICD-10-CM

## 2019-11-18 PROCEDURE — 99396 PREV VISIT EST AGE 40-64: CPT | Performed by: INTERNAL MEDICINE

## 2019-11-18 NOTE — PROGRESS NOTES
Patient presents with:  Physical: RG rm 9 Physical      HPI:  Here for cpe. Has dm2, due for labs, taking meds no se's. Pt has left inguinal hernia did not have it repaired, denies pain or changes in bm's.      Review of Systems   Constitutional: Negative f stick route daily. 100 strip 1   • ACCU-CHEK FASTCLIX LANCETS Does not apply Misc 1 lancet by Finger stick route daily. Use as directed.  100 each 1   • metFORMIN HCl  MG Oral Tablet 24 Hr Take 2 tablets (1,000 mg total) by mouth daily with breakfast. Skin: Skin is warm and dry. No rash noted. No erythema. No pallor. Psychiatric: Normal mood and affect.    Diabetic foot exam: Normal barefoot bilateral monofilament foot exam, no wounds on visual inspection, pedal pulses present    A/P:    Hyperglycemi

## 2019-12-06 ENCOUNTER — LAB ENCOUNTER (OUTPATIENT)
Dept: LAB | Facility: HOSPITAL | Age: 64
End: 2019-12-06
Attending: INTERNAL MEDICINE
Payer: COMMERCIAL

## 2019-12-06 DIAGNOSIS — Z13.0 SCREENING FOR DISORDER OF BLOOD AND BLOOD-FORMING ORGANS: ICD-10-CM

## 2019-12-06 DIAGNOSIS — R73.9 HYPERGLYCEMIA: ICD-10-CM

## 2019-12-06 DIAGNOSIS — Z13.220 ENCOUNTER FOR SCREENING FOR LIPID DISORDER: ICD-10-CM

## 2019-12-06 DIAGNOSIS — Z13.0 SCREENING FOR ENDOCRINE, METABOLIC AND IMMUNITY DISORDER: ICD-10-CM

## 2019-12-06 DIAGNOSIS — Z13.228 SCREENING FOR ENDOCRINE, METABOLIC AND IMMUNITY DISORDER: ICD-10-CM

## 2019-12-06 DIAGNOSIS — Z13.29 THYROID DISORDER SCREENING: ICD-10-CM

## 2019-12-06 DIAGNOSIS — Z12.5 SCREENING FOR PROSTATE CANCER: ICD-10-CM

## 2019-12-06 DIAGNOSIS — Z13.29 SCREENING FOR ENDOCRINE, METABOLIC AND IMMUNITY DISORDER: ICD-10-CM

## 2019-12-06 DIAGNOSIS — Z00.00 ROUTINE GENERAL MEDICAL EXAMINATION AT A HEALTH CARE FACILITY: ICD-10-CM

## 2019-12-06 PROCEDURE — 85025 COMPLETE CBC W/AUTO DIFF WBC: CPT

## 2019-12-06 PROCEDURE — 36415 COLL VENOUS BLD VENIPUNCTURE: CPT

## 2019-12-06 PROCEDURE — 83036 HEMOGLOBIN GLYCOSYLATED A1C: CPT

## 2019-12-08 DIAGNOSIS — E11.9 TYPE 2 DIABETES MELLITUS WITHOUT COMPLICATION, WITHOUT LONG-TERM CURRENT USE OF INSULIN (HCC): Primary | ICD-10-CM

## 2019-12-09 ENCOUNTER — APPOINTMENT (OUTPATIENT)
Dept: LAB | Facility: HOSPITAL | Age: 64
End: 2019-12-09
Attending: INTERNAL MEDICINE
Payer: COMMERCIAL

## 2019-12-09 DIAGNOSIS — Z13.0 SCREENING FOR ENDOCRINE, METABOLIC AND IMMUNITY DISORDER: ICD-10-CM

## 2019-12-09 DIAGNOSIS — Z00.00 ROUTINE GENERAL MEDICAL EXAMINATION AT A HEALTH CARE FACILITY: ICD-10-CM

## 2019-12-09 DIAGNOSIS — R73.9 HYPERGLYCEMIA: ICD-10-CM

## 2019-12-09 DIAGNOSIS — Z13.29 SCREENING FOR ENDOCRINE, METABOLIC AND IMMUNITY DISORDER: ICD-10-CM

## 2019-12-09 DIAGNOSIS — Z13.29 THYROID DISORDER SCREENING: ICD-10-CM

## 2019-12-09 DIAGNOSIS — Z13.220 ENCOUNTER FOR SCREENING FOR LIPID DISORDER: ICD-10-CM

## 2019-12-09 DIAGNOSIS — Z12.5 SCREENING FOR PROSTATE CANCER: ICD-10-CM

## 2019-12-09 DIAGNOSIS — Z13.228 SCREENING FOR ENDOCRINE, METABOLIC AND IMMUNITY DISORDER: ICD-10-CM

## 2019-12-09 DIAGNOSIS — Z13.0 SCREENING FOR DISORDER OF BLOOD AND BLOOD-FORMING ORGANS: ICD-10-CM

## 2019-12-09 PROCEDURE — 82043 UR ALBUMIN QUANTITATIVE: CPT

## 2019-12-09 PROCEDURE — 84443 ASSAY THYROID STIM HORMONE: CPT

## 2019-12-09 PROCEDURE — 80061 LIPID PANEL: CPT

## 2019-12-09 PROCEDURE — 82570 ASSAY OF URINE CREATININE: CPT

## 2019-12-09 PROCEDURE — 80053 COMPREHEN METABOLIC PANEL: CPT

## 2020-07-02 ENCOUNTER — OFFICE VISIT (OUTPATIENT)
Dept: SURGERY | Facility: CLINIC | Age: 65
End: 2020-07-02
Payer: COMMERCIAL

## 2020-07-02 VITALS
HEIGHT: 67 IN | HEART RATE: 88 BPM | SYSTOLIC BLOOD PRESSURE: 129 MMHG | WEIGHT: 164 LBS | BODY MASS INDEX: 25.74 KG/M2 | DIASTOLIC BLOOD PRESSURE: 72 MMHG | TEMPERATURE: 98 F

## 2020-07-02 DIAGNOSIS — E11.9 TYPE 2 DIABETES MELLITUS WITHOUT COMPLICATION, WITHOUT LONG-TERM CURRENT USE OF INSULIN (HCC): ICD-10-CM

## 2020-07-02 DIAGNOSIS — K40.90 LEFT INGUINAL HERNIA: Primary | ICD-10-CM

## 2020-07-02 PROCEDURE — 99243 OFF/OP CNSLTJ NEW/EST LOW 30: CPT | Performed by: COLON & RECTAL SURGERY

## 2020-07-02 NOTE — H&P (VIEW-ONLY)
New Patient Visit Note       Active Problems      1. Left inguinal hernia    2.  Type 2 diabetes mellitus without complication, without long-term current use of insulin [facility])        Chief Complaint   Patient presents with:  Hernia: Left groin hernia      Hi daily.  ACCU-CHEK FASTCLIX LANCETS Does not apply Misc, 1 lancet by Finger stick route daily. Use as directed.   metFORMIN HCl  MG Oral Tablet 24 Hr, Take 2 tablets (1,000 mg total) by mouth daily with breakfast. (Patient taking differently: Take 500 were evaluated in the standing position. There was a visible and palpable bulge in the left groin. This was reducible. The scrotum was invaginated into the left inguinal canal and a palpable bulge with positive impulse was present.   There was no bulge

## 2020-07-02 NOTE — PATIENT INSTRUCTIONS
Assessment   Left inguinal hernia  (primary encounter diagnosis)  Type 2 diabetes mellitus without complication, without long-term current use of insulin [facility])      Plan   Patient has an enlarging but reducible left inguinal hernia.     Recommend proceeding

## 2020-07-15 RX ORDER — MELATONIN
1 DAILY
COMMUNITY

## 2020-07-15 RX ORDER — MULTIVITAMIN
1 TABLET ORAL DAILY
COMMUNITY

## 2020-07-15 RX ORDER — OMEGA-3-ACID ETHYL ESTERS 1 G/1
1 CAPSULE, LIQUID FILLED ORAL DAILY
COMMUNITY

## 2020-07-15 RX ORDER — MULTIVIT WITH MINERALS/LUTEIN
1000 TABLET ORAL DAILY
COMMUNITY

## 2020-07-15 RX ORDER — AMPICILLIN TRIHYDRATE 250 MG
500 CAPSULE ORAL DAILY
COMMUNITY

## 2020-07-15 RX ORDER — BIOTIN 1 MG
1 TABLET ORAL DAILY
COMMUNITY

## 2020-07-16 ENCOUNTER — TELEPHONE (OUTPATIENT)
Dept: INTERNAL MEDICINE CLINIC | Facility: CLINIC | Age: 65
End: 2020-07-16

## 2020-07-16 NOTE — TELEPHONE ENCOUNTER
Received PW from [facility] for Hernia Repair Surgery scheduled for 7-, [facility] to schedule pre-op     PW on back counter up front.

## 2020-07-16 NOTE — TELEPHONE ENCOUNTER
Future Appointments   Date Time Provider Martinez Pettit   7/23/2020  1:40 PM Sim Whiteside MD [facility]     Paperwork in red folder

## 2020-07-20 ENCOUNTER — APPOINTMENT (OUTPATIENT)
Dept: LAB | Facility: HOSPITAL | Age: 65
End: 2020-07-20
Payer: COMMERCIAL

## 2020-07-20 DIAGNOSIS — K40.90 LEFT INGUINAL HERNIA: ICD-10-CM

## 2020-07-20 DIAGNOSIS — E11.9 TYPE 2 DIABETES MELLITUS WITHOUT COMPLICATION, WITHOUT LONG-TERM CURRENT USE OF INSULIN (HCC): ICD-10-CM

## 2020-07-20 LAB
ALBUMIN SERPL-MCNC: 3.6 G/DL (ref 3.4–5)
ALBUMIN/GLOB SERPL: 1.1 {RATIO} (ref 1–2)
ALP LIVER SERPL-CCNC: 55 U/L (ref 45–117)
ALT SERPL-CCNC: 27 U/L (ref 16–61)
ANION GAP SERPL CALC-SCNC: 1 MMOL/L (ref 0–18)
AST SERPL-CCNC: 14 U/L (ref 15–37)
ATRIAL RATE: 62 BPM
BILIRUB SERPL-MCNC: 0.4 MG/DL (ref 0.1–2)
BUN BLD-MCNC: 16 MG/DL (ref 7–18)
BUN/CREAT SERPL: 15.4 (ref 10–20)
CALCIUM BLD-MCNC: 8.7 MG/DL (ref 8.5–10.1)
CHLORIDE SERPL-SCNC: 106 MMOL/L (ref 98–112)
CO2 SERPL-SCNC: 30 MMOL/L (ref 21–32)
CREAT BLD-MCNC: 1.04 MG/DL (ref 0.7–1.3)
EST. AVERAGE GLUCOSE BLD GHB EST-MCNC: 131 MG/DL (ref 68–126)
GLOBULIN PLAS-MCNC: 3.3 G/DL (ref 2.8–4.4)
GLUCOSE BLD-MCNC: 159 MG/DL (ref 70–99)
HBA1C MFR BLD HPLC: 6.2 % (ref ?–5.7)
M PROTEIN MFR SERPL ELPH: 6.9 G/DL (ref 6.4–8.2)
OSMOLALITY SERPL CALC.SUM OF ELEC: 289 MOSM/KG (ref 275–295)
P AXIS: 64 DEGREES
P-R INTERVAL: 160 MS
PATIENT FASTING Y/N/NP: YES
POTASSIUM SERPL-SCNC: 4.2 MMOL/L (ref 3.5–5.1)
Q-T INTERVAL: 406 MS
QRS DURATION: 94 MS
QTC CALCULATION (BEZET): 412 MS
R AXIS: 57 DEGREES
SODIUM SERPL-SCNC: 137 MMOL/L (ref 136–145)
T AXIS: 73 DEGREES
VENTRICULAR RATE: 62 BPM

## 2020-07-20 PROCEDURE — 83036 HEMOGLOBIN GLYCOSYLATED A1C: CPT

## 2020-07-20 PROCEDURE — 93010 ELECTROCARDIOGRAM REPORT: CPT | Performed by: INTERNAL MEDICINE

## 2020-07-20 PROCEDURE — 36415 COLL VENOUS BLD VENIPUNCTURE: CPT

## 2020-07-20 PROCEDURE — 93005 ELECTROCARDIOGRAM TRACING: CPT

## 2020-07-20 PROCEDURE — 80053 COMPREHEN METABOLIC PANEL: CPT

## 2020-07-23 ENCOUNTER — OFFICE VISIT (OUTPATIENT)
Dept: INTERNAL MEDICINE CLINIC | Facility: CLINIC | Age: 65
End: 2020-07-23
Payer: COMMERCIAL

## 2020-07-23 VITALS
TEMPERATURE: 98 F | OXYGEN SATURATION: 98 % | HEIGHT: 67 IN | SYSTOLIC BLOOD PRESSURE: 128 MMHG | DIASTOLIC BLOOD PRESSURE: 64 MMHG | BODY MASS INDEX: 25.87 KG/M2 | WEIGHT: 164.81 LBS | RESPIRATION RATE: 18 BRPM | HEART RATE: 82 BPM

## 2020-07-23 DIAGNOSIS — Z01.818 PREOP EXAMINATION: Primary | ICD-10-CM

## 2020-07-23 DIAGNOSIS — E11.9 TYPE 2 DIABETES MELLITUS WITHOUT COMPLICATION, WITHOUT LONG-TERM CURRENT USE OF INSULIN (HCC): ICD-10-CM

## 2020-07-23 DIAGNOSIS — K40.90 LEFT INGUINAL HERNIA: ICD-10-CM

## 2020-07-23 PROCEDURE — 99244 OFF/OP CNSLTJ NEW/EST MOD 40: CPT | Performed by: INTERNAL MEDICINE

## 2020-07-23 PROCEDURE — 3008F BODY MASS INDEX DOCD: CPT | Performed by: INTERNAL MEDICINE

## 2020-07-23 PROCEDURE — 3078F DIAST BP <80 MM HG: CPT | Performed by: INTERNAL MEDICINE

## 2020-07-23 PROCEDURE — 3074F SYST BP LT 130 MM HG: CPT | Performed by: INTERNAL MEDICINE

## 2020-07-23 NOTE — PROGRESS NOTES
[facility]  PRE OP RISK ASSESSMENT    REASON FOR CONSULT: Pre-op risk assessment for surgical procedure: Robotic laparoscopic left inguinal hernia repair with mesh planned for: 7/31/2020 with: General anesthesia.     REQUESTING PHYSICIAN: Avril Madison daily. Allergies:     Allergies As of Date: 07/23/2020  Allergen                          Noted       Reaction  POLLEN                            09/04/2018  OTHER (SEE COMMENTS)    Review Complete  07/23/2020      SOCIAL HISTORY: Social History Heart Disease Mother    • Cancer Neg         REVIEW OF SYSTEMS:    GENERAL: feels well otherwise  SKIN: denies any unusual skin lesions  HEENT: denies blurred vision or double vision denies nasal congestion  LUNGS: denies shortness of breath with exertion insulin (hcc)    No orders of the defined types were placed in this encounter.       Imaging & Consults:  None    Sim Whiteside

## 2020-07-24 ENCOUNTER — TELEPHONE (OUTPATIENT)
Dept: INTERNAL MEDICINE CLINIC | Facility: CLINIC | Age: 65
End: 2020-07-24

## 2020-07-24 NOTE — TELEPHONE ENCOUNTER
Preop forms faxed to [facility] pre[facility]. Confirmation received and forms placed in AD preop bin.

## 2020-07-29 ENCOUNTER — LAB ENCOUNTER (OUTPATIENT)
Dept: LAB | Facility: HOSPITAL | Age: 65
End: 2020-07-29
Attending: COLON & RECTAL SURGERY
Payer: COMMERCIAL

## 2020-07-29 DIAGNOSIS — K40.90 LEFT INGUINAL HERNIA: ICD-10-CM

## 2020-07-29 LAB — SARS-COV-2 RNA RESP QL NAA+PROBE: NOT DETECTED

## 2020-07-31 ENCOUNTER — HOSPITAL ENCOUNTER (OUTPATIENT)
Facility: HOSPITAL | Age: 65
Setting detail: HOSPITAL OUTPATIENT SURGERY
Discharge: HOME | End: 2020-07-31
Attending: COLON & RECTAL SURGERY | Admitting: COLON & RECTAL SURGERY
Payer: COMMERCIAL

## 2020-07-31 ENCOUNTER — ANESTHESIA (OUTPATIENT)
Dept: SURGERY | Facility: HOSPITAL | Age: 65
End: 2020-07-31
Payer: COMMERCIAL

## 2020-07-31 ENCOUNTER — ANESTHESIA EVENT (OUTPATIENT)
Dept: SURGERY | Facility: HOSPITAL | Age: 65
End: 2020-07-31
Payer: COMMERCIAL

## 2020-07-31 VITALS
HEART RATE: 64 BPM | SYSTOLIC BLOOD PRESSURE: 113 MMHG | DIASTOLIC BLOOD PRESSURE: 65 MMHG | TEMPERATURE: 98 F | BODY MASS INDEX: 25.96 KG/M2 | RESPIRATION RATE: 16 BRPM | OXYGEN SATURATION: 100 % | WEIGHT: 165.38 LBS | HEIGHT: 67 IN

## 2020-07-31 DIAGNOSIS — K40.90 LEFT INGUINAL HERNIA: Primary | ICD-10-CM

## 2020-07-31 LAB
GLUCOSE BLD-MCNC: 134 MG/DL (ref 70–99)
GLUCOSE BLD-MCNC: 193 MG/DL (ref 70–99)

## 2020-07-31 PROCEDURE — 8E0W4CZ ROBOTIC ASSISTED PROCEDURE OF TRUNK REGION, PERCUTANEOUS ENDOSCOPIC APPROACH: ICD-10-PCS | Performed by: COLON & RECTAL SURGERY

## 2020-07-31 PROCEDURE — 0YU64JZ SUPPLEMENT LEFT INGUINAL REGION WITH SYNTHETIC SUBSTITUTE, PERCUTANEOUS ENDOSCOPIC APPROACH: ICD-10-PCS | Performed by: COLON & RECTAL SURGERY

## 2020-07-31 PROCEDURE — 49650 LAP ING HERNIA REPAIR INIT: CPT | Performed by: COLON & RECTAL SURGERY

## 2020-07-31 PROCEDURE — 49650 LAP ING HERNIA REPAIR INIT: CPT | Performed by: PHYSICIAN ASSISTANT

## 2020-07-31 DEVICE — PROGRIP MESH: Type: IMPLANTABLE DEVICE | Site: INGUINAL | Status: FUNCTIONAL

## 2020-07-31 RX ORDER — METOCLOPRAMIDE HYDROCHLORIDE 5 MG/ML
INJECTION INTRAMUSCULAR; INTRAVENOUS AS NEEDED
Status: DISCONTINUED | OUTPATIENT
Start: 2020-07-31 | End: 2020-07-31 | Stop reason: SURG

## 2020-07-31 RX ORDER — HYDROCODONE BITARTRATE AND ACETAMINOPHEN 5; 325 MG/1; MG/1
2 TABLET ORAL AS NEEDED
Status: DISCONTINUED | OUTPATIENT
Start: 2020-07-31 | End: 2020-07-31

## 2020-07-31 RX ORDER — HYDROMORPHONE HYDROCHLORIDE 1 MG/ML
0.4 INJECTION, SOLUTION INTRAMUSCULAR; INTRAVENOUS; SUBCUTANEOUS EVERY 5 MIN PRN
Status: DISCONTINUED | OUTPATIENT
Start: 2020-07-31 | End: 2020-07-31

## 2020-07-31 RX ORDER — GLYCOPYRROLATE 0.2 MG/ML
INJECTION, SOLUTION INTRAMUSCULAR; INTRAVENOUS AS NEEDED
Status: DISCONTINUED | OUTPATIENT
Start: 2020-07-31 | End: 2020-07-31 | Stop reason: SURG

## 2020-07-31 RX ORDER — BUPIVACAINE HYDROCHLORIDE AND EPINEPHRINE 2.5; 5 MG/ML; UG/ML
INJECTION, SOLUTION EPIDURAL; INFILTRATION; INTRACAUDAL; PERINEURAL AS NEEDED
Status: DISCONTINUED | OUTPATIENT
Start: 2020-07-31 | End: 2020-07-31 | Stop reason: HOSPADM

## 2020-07-31 RX ORDER — SODIUM CHLORIDE, SODIUM LACTATE, POTASSIUM CHLORIDE, CALCIUM CHLORIDE 600; 310; 30; 20 MG/100ML; MG/100ML; MG/100ML; MG/100ML
INJECTION, SOLUTION INTRAVENOUS CONTINUOUS
Status: DISCONTINUED | OUTPATIENT
Start: 2020-07-31 | End: 2020-07-31

## 2020-07-31 RX ORDER — HYDROCODONE BITARTRATE AND ACETAMINOPHEN 5; 325 MG/1; MG/1
1 TABLET ORAL AS NEEDED
Status: DISCONTINUED | OUTPATIENT
Start: 2020-07-31 | End: 2020-07-31

## 2020-07-31 RX ORDER — ONDANSETRON 2 MG/ML
INJECTION INTRAMUSCULAR; INTRAVENOUS AS NEEDED
Status: DISCONTINUED | OUTPATIENT
Start: 2020-07-31 | End: 2020-07-31 | Stop reason: SURG

## 2020-07-31 RX ORDER — HEPARIN SODIUM 5000 [USP'U]/ML
5000 INJECTION, SOLUTION INTRAVENOUS; SUBCUTANEOUS ONCE
Status: COMPLETED | OUTPATIENT
Start: 2020-07-31 | End: 2020-07-31

## 2020-07-31 RX ORDER — NALOXONE HYDROCHLORIDE 0.4 MG/ML
80 INJECTION, SOLUTION INTRAMUSCULAR; INTRAVENOUS; SUBCUTANEOUS AS NEEDED
Status: DISCONTINUED | OUTPATIENT
Start: 2020-07-31 | End: 2020-07-31

## 2020-07-31 RX ORDER — CEFAZOLIN SODIUM/WATER 2 G/20 ML
2 SYRINGE (ML) INTRAVENOUS ONCE
Status: COMPLETED | OUTPATIENT
Start: 2020-07-31 | End: 2020-07-31

## 2020-07-31 RX ORDER — NEOSTIGMINE METHYLSULFATE 1 MG/ML
INJECTION INTRAVENOUS AS NEEDED
Status: DISCONTINUED | OUTPATIENT
Start: 2020-07-31 | End: 2020-07-31 | Stop reason: SURG

## 2020-07-31 RX ORDER — DEXTROSE MONOHYDRATE 25 G/50ML
50 INJECTION, SOLUTION INTRAVENOUS
Status: DISCONTINUED | OUTPATIENT
Start: 2020-07-31 | End: 2020-07-31

## 2020-07-31 RX ORDER — OXYCODONE HYDROCHLORIDE 5 MG/1
5 TABLET ORAL EVERY 6 HOURS PRN
Qty: 20 TABLET | Refills: 0 | Status: SHIPPED | OUTPATIENT
Start: 2020-07-31 | End: 2020-08-07

## 2020-07-31 RX ORDER — ONDANSETRON 2 MG/ML
4 INJECTION INTRAMUSCULAR; INTRAVENOUS AS NEEDED
Status: DISCONTINUED | OUTPATIENT
Start: 2020-07-31 | End: 2020-07-31

## 2020-07-31 RX ORDER — ACETAMINOPHEN 500 MG
1000 TABLET ORAL EVERY 6 HOURS PRN
COMMUNITY
End: 2021-06-28 | Stop reason: ALTCHOICE

## 2020-07-31 RX ORDER — ACETAMINOPHEN 500 MG
1000 TABLET ORAL ONCE
Status: DISCONTINUED | OUTPATIENT
Start: 2020-07-31 | End: 2020-07-31

## 2020-07-31 RX ORDER — DEXTROSE MONOHYDRATE 25 G/50ML
50 INJECTION, SOLUTION INTRAVENOUS
Status: DISCONTINUED | OUTPATIENT
Start: 2020-07-31 | End: 2020-07-31 | Stop reason: HOSPADM

## 2020-07-31 RX ADMIN — GLYCOPYRROLATE 0.4 MG: 0.2 INJECTION, SOLUTION INTRAMUSCULAR; INTRAVENOUS at 09:30:00

## 2020-07-31 RX ADMIN — METOCLOPRAMIDE HYDROCHLORIDE 10 MG: 5 INJECTION INTRAMUSCULAR; INTRAVENOUS at 08:17:00

## 2020-07-31 RX ADMIN — ONDANSETRON 4 MG: 2 INJECTION INTRAMUSCULAR; INTRAVENOUS at 09:30:00

## 2020-07-31 RX ADMIN — NEOSTIGMINE METHYLSULFATE 3 MG: 1 INJECTION INTRAVENOUS at 09:30:00

## 2020-07-31 RX ADMIN — CEFAZOLIN SODIUM/WATER 2 G: 2 G/20 ML SYRINGE (ML) INTRAVENOUS at 07:59:00

## 2020-07-31 NOTE — ANESTHESIA POSTPROCEDURE EVALUATION
[address] Patient Status:  Hospital Outpatient Surgery   Age/Gender 60year old male MRN [id number]   Location [facility] Attending Elva Blanca MD   Hosp Day # 0 PCP Joycelyn Boss MD       An

## 2020-07-31 NOTE — OPERATIVE REPORT
[facility]  Operative Note    Issac Cook Location: OR   CSN [id number] MRN [id number]    1955 Age 60year old   Admission Date 2020 Operation Date 2020   Attending Physician Alka Claire MD Operating Physician Dao George defect was identified. Two 8-mm trocars were placed on either side of the abdomen in the midclavicular line under direct vision. The 5mm trocar was upsized to an 8mm robot trocar. The patient was placed in Trendelenburg position.  The robot was then docked using subcuticular 4-0 Monocryl suture. Local anesthetic was injected. Mastisol, Steri-Strips, 4x4 gauze and Tegaderm were used to seal the incisions. The scrotum was palpated and the testicles were retracted back into their anatomic position.     The julio c

## 2020-07-31 NOTE — ANESTHESIA PREPROCEDURE EVALUATION
PRE-OP EVALUATION    Patient Name: Raffy Matta    Pre-op Diagnosis: Left inguinal hernia [K40.90]    Procedure(s):   ROBOTIC LAPAROSCOPIC LEFT INGUINAL HERNIA REPAIR WITH MESH    Surgeon(s) and Role:     * Renato Gandhi MD - Primary    Pre-op vit tablet, Rfl: 3        Allergies: Pollen      Anesthesia Evaluation    Patient summary reviewed. Anesthetic Complications  (-) history of anesthetic complications         GI/Hepatic/Renal    Negative GI/hepatic/renal ROS.                              Card discussed including nerve injury, prolonged paresthesia/ weakness after block, bleeding, infection and possible paresthesias upon placement of PNB.   Questions answered    Plan/risks discussed with: patient                Present on Admission:  **None**

## 2020-07-31 NOTE — INTERVAL H&P NOTE
Pre-op Diagnosis: Left inguinal hernia [K40.90]    The above referenced H&P was reviewed by Oriana Varghese MD on 7/31/2020, the patient was examined and no significant changes have occurred in the patient's condition since the H&P was performed.   I

## 2020-07-31 NOTE — ANESTHESIA PROCEDURE NOTES
Airway  Date/Time: 7/31/2020 7:55 AM  Urgency: elective    Airway not difficult    General Information and Staff    Patient location during procedure: OR  Anesthesiologist: Bhavik Richardson MD  Performed: anesthesiologist     Indications and Patient Cond

## 2020-08-06 RX ORDER — METFORMIN HYDROCHLORIDE 500 MG/1
1000 TABLET, EXTENDED RELEASE ORAL
Qty: 90 TABLET | Refills: 0 | Status: SHIPPED | OUTPATIENT
Start: 2020-08-06 | End: 2020-08-21

## 2020-08-06 NOTE — TELEPHONE ENCOUNTER
PASSED per protocol, refill sent.   Last PE 11.18.19   Future Appointments   Date Time Provider Martinez Pettit   8/7/2020  2:15 PM Jacoby Tellez [facility]

## 2020-08-07 ENCOUNTER — OFFICE VISIT (OUTPATIENT)
Dept: SURGERY | Facility: CLINIC | Age: 65
End: 2020-08-07

## 2020-08-07 VITALS
WEIGHT: 165 LBS | DIASTOLIC BLOOD PRESSURE: 63 MMHG | RESPIRATION RATE: 16 BRPM | BODY MASS INDEX: 26 KG/M2 | HEART RATE: 88 BPM | SYSTOLIC BLOOD PRESSURE: 110 MMHG | TEMPERATURE: 97 F

## 2020-08-07 DIAGNOSIS — K40.90 LEFT INGUINAL HERNIA: Primary | ICD-10-CM

## 2020-08-07 PROCEDURE — 3074F SYST BP LT 130 MM HG: CPT | Performed by: PHYSICIAN ASSISTANT

## 2020-08-07 PROCEDURE — 3078F DIAST BP <80 MM HG: CPT | Performed by: PHYSICIAN ASSISTANT

## 2020-08-07 PROCEDURE — 99024 POSTOP FOLLOW-UP VISIT: CPT | Performed by: PHYSICIAN ASSISTANT

## 2020-08-07 NOTE — PROGRESS NOTES
Post Operative Visit Note       Active Problems  1.  Left inguinal hernia         Chief Complaint   Patient presents with:  Post-Op: PO 7/31 left inguinal hernia repair VKA      History of Present Illness   This patient presents following robotic laparoscop Smoking status: Never Smoker      Smokeless tobacco: Never Used    Substance and Sexual Activity      Alcohol use: Yes        Comment: social/weekend      Drug use: No      Sexual activity: Yes    Other Topics      Concerns:       Current Outpatient Medica Negative for difficulty urinating, dysuria, frequency and urgency. Musculoskeletal: Negative for arthralgias and myalgias. Skin: Negative for color change and rash. Neurological: Negative for tremors, syncope and weakness.    Hematological: Negative f at this time. This patient can see me on an as-needed basis. This patient should return urgently for any problems or complications related to the surgical intervention. The patient voiced understanding and agreement with this plan.            No orders o

## 2020-08-21 ENCOUNTER — TELEPHONE (OUTPATIENT)
Dept: SURGERY | Facility: CLINIC | Age: 65
End: 2020-08-21

## 2020-08-21 RX ORDER — METFORMIN HYDROCHLORIDE 500 MG/1
1000 TABLET, EXTENDED RELEASE ORAL
Qty: 180 TABLET | Refills: 0 | Status: SHIPPED | OUTPATIENT
Start: 2020-08-21 | End: 2020-08-26

## 2020-08-21 NOTE — TELEPHONE ENCOUNTER
Last VISIT 07/23/20    Last REFILL 08/06/20 qty 45 days w/o refills sent, per insurance, pt needs 90 day refill    Last LABS 07/20/20 A1c, CMP  done    No future appointments. Per PROTOCOL? Passed    Please Approve or Deny.

## 2020-08-26 ENCOUNTER — MED REC SCAN ONLY (OUTPATIENT)
Dept: SURGERY | Facility: CLINIC | Age: 65
End: 2020-08-26

## 2020-08-26 RX ORDER — METFORMIN HYDROCHLORIDE 500 MG/1
1000 TABLET, EXTENDED RELEASE ORAL
Qty: 180 TABLET | Refills: 0 | Status: SHIPPED | OUTPATIENT
Start: 2020-08-26 | End: 2020-12-18

## 2020-08-26 NOTE — TELEPHONE ENCOUNTER
Patient is calling and states that he ordered his medication from [facility] mail order and was told by them, that they have tried to contact us three times with no reply. Patient is in need of medication.   metFORMIN HCl  MG Oral Tablet 24 Hr  I told the

## 2020-10-01 ENCOUNTER — TELEPHONE (OUTPATIENT)
Dept: INTERNAL MEDICINE CLINIC | Facility: CLINIC | Age: 65
End: 2020-10-01

## 2020-10-01 DIAGNOSIS — Z23 NEED FOR TDAP VACCINATION: Primary | ICD-10-CM

## 2020-10-01 NOTE — TELEPHONE ENCOUNTER
Pt made an appt for a TDAP due to his daughter having a baby and they will be living with him   Future Appointments   Date Time Provider Martinez Pettit   10/12/2020  4:00 PM [facility]     Please advise

## 2020-11-09 ENCOUNTER — HOSPITAL ENCOUNTER (OUTPATIENT)
Age: 65
Discharge: HOME | End: 2020-11-09
Payer: COMMERCIAL

## 2020-11-09 VITALS
HEIGHT: 67 IN | BODY MASS INDEX: 25.74 KG/M2 | WEIGHT: 164 LBS | DIASTOLIC BLOOD PRESSURE: 74 MMHG | HEART RATE: 72 BPM | TEMPERATURE: 98 F | SYSTOLIC BLOOD PRESSURE: 132 MMHG | OXYGEN SATURATION: 97 % | RESPIRATION RATE: 16 BRPM

## 2020-11-09 DIAGNOSIS — Z20.822 CLOSE EXPOSURE TO COVID-19 VIRUS: Primary | ICD-10-CM

## 2020-11-09 PROCEDURE — 99202 OFFICE O/P NEW SF 15 MIN: CPT | Performed by: NURSE PRACTITIONER

## 2020-11-09 NOTE — ED PROVIDER NOTES
Patient Seen in: Immediate [address] [place] [address]      History   Patient presents with:  Testing    Stated Complaint: covid test    Patient presents to immediate care for COVID testing.   Patient states they have had a positive exposure within the last wee note reviewed. Constitutional:       Appearance: Normal appearance. HENT:      Head: Normocephalic. Mouth/Throat:      Mouth: Mucous membranes are moist.      Pharynx: Oropharynx is clear. Uvula midline.  No pharyngeal swelling, oropharyngeal exuda

## 2020-11-09 NOTE — ED INITIAL ASSESSMENT (HPI)
Pt c/o sent here today for COVID-19 testing due to someone at work testing positive and wants him to get tested before returning to work. Pt denies any s/s.

## 2020-12-17 DIAGNOSIS — R73.9 HYPERGLYCEMIA: ICD-10-CM

## 2020-12-18 RX ORDER — BLOOD SUGAR DIAGNOSTIC
1 STRIP MISCELLANEOUS DAILY
Qty: 100 STRIP | Refills: 1 | Status: SHIPPED | OUTPATIENT
Start: 2020-12-18 | End: 2021-10-04

## 2020-12-18 RX ORDER — METFORMIN HYDROCHLORIDE 500 MG/1
TABLET, EXTENDED RELEASE ORAL
Qty: 180 TABLET | Refills: 0 | Status: SHIPPED | OUTPATIENT
Start: 2020-12-18 | End: 2020-12-23

## 2020-12-18 NOTE — TELEPHONE ENCOUNTER
Accu-chek strips-PASSED per protocol, refill sent. Last OV 7.23.20 w/ AD (preop)   Last PE 11.18.19   Last REFILL 8.26.20 Metformin ER 500mg #180 0R  Last LABS 7.20.20 CMP, HgA1c    No future appointments. Per PROTOCOL?  FAILED-no microalb in last

## 2020-12-23 RX ORDER — METFORMIN HYDROCHLORIDE 500 MG/1
TABLET, EXTENDED RELEASE ORAL
Qty: 180 TABLET | Refills: 0 | Status: SHIPPED | OUTPATIENT
Start: 2020-12-23 | End: 2021-12-29

## 2020-12-23 NOTE — TELEPHONE ENCOUNTER
Last OV 7.23.20 w/ AD (pre-op)   Last PE 11.18.19-Overdue   Last REFILL 12.18.20 Metformin ER 500mg #180 0R  Last LABS 7.20.20 CMP, HgA1c    No future appointments. Per PROTOCOL?  FAILED-no micro in last 12 months     Please Advise

## 2021-06-28 ENCOUNTER — OFFICE VISIT (OUTPATIENT)
Dept: INTERNAL MEDICINE CLINIC | Facility: CLINIC | Age: 66
End: 2021-06-28
Payer: COMMERCIAL

## 2021-06-28 VITALS
RESPIRATION RATE: 18 BRPM | BODY MASS INDEX: 26.65 KG/M2 | SYSTOLIC BLOOD PRESSURE: 110 MMHG | DIASTOLIC BLOOD PRESSURE: 72 MMHG | HEIGHT: 67 IN | WEIGHT: 169.81 LBS

## 2021-06-28 DIAGNOSIS — Z13.0 SCREENING FOR DEFICIENCY ANEMIA: ICD-10-CM

## 2021-06-28 DIAGNOSIS — J30.1 SEASONAL ALLERGIC RHINITIS DUE TO POLLEN: ICD-10-CM

## 2021-06-28 DIAGNOSIS — Z13.220 LIPID SCREENING: ICD-10-CM

## 2021-06-28 DIAGNOSIS — E11.9 TYPE 2 DIABETES MELLITUS WITHOUT COMPLICATION, WITHOUT LONG-TERM CURRENT USE OF INSULIN (HCC): ICD-10-CM

## 2021-06-28 DIAGNOSIS — Z12.5 SCREENING FOR PROSTATE CANCER: ICD-10-CM

## 2021-06-28 DIAGNOSIS — Z00.00 WELLNESS EXAMINATION: Primary | ICD-10-CM

## 2021-06-28 PROCEDURE — 99396 PREV VISIT EST AGE 40-64: CPT | Performed by: FAMILY MEDICINE

## 2021-06-28 PROCEDURE — 3074F SYST BP LT 130 MM HG: CPT | Performed by: FAMILY MEDICINE

## 2021-06-28 PROCEDURE — 3008F BODY MASS INDEX DOCD: CPT | Performed by: FAMILY MEDICINE

## 2021-06-28 PROCEDURE — 3078F DIAST BP <80 MM HG: CPT | Performed by: FAMILY MEDICINE

## 2021-06-28 RX ORDER — FEXOFENADINE HCL 180 MG/1
180 TABLET ORAL DAILY
COMMUNITY

## 2021-06-28 NOTE — PROGRESS NOTES
Laine Ross  8/9/1955    Patient presents with:  Wellness Visit: MR benitez 9 annual pe      HPI:   Laine Ross is a 63year old male who presents for his wellness exam. He currently is taking metformin 500 mg daily. His last A1c was 6.2 last year.  Does not r Smoking status: Never Smoker      Smokeless tobacco: Never Used    Vaping Use      Vaping Use: Never used    Alcohol use: Yes      Comment: social/weekend once per month    Drug use: No        REVIEW OF SYSTEMS:   GENERAL: feels well otherwise  SKIN: no ra

## 2021-07-01 RX ORDER — BLOOD SUGAR DIAGNOSTIC
1 STRIP MISCELLANEOUS DAILY
Qty: 100 STRIP | Refills: 1 | Status: SHIPPED | OUTPATIENT
Start: 2021-07-01 | End: 2022-01-12

## 2021-07-14 ENCOUNTER — LAB ENCOUNTER (OUTPATIENT)
Dept: LAB | Facility: HOSPITAL | Age: 66
End: 2021-07-14
Attending: FAMILY MEDICINE
Payer: COMMERCIAL

## 2021-07-14 DIAGNOSIS — Z13.0 SCREENING FOR DEFICIENCY ANEMIA: ICD-10-CM

## 2021-07-14 DIAGNOSIS — Z12.5 SCREENING FOR PROSTATE CANCER: ICD-10-CM

## 2021-07-14 DIAGNOSIS — Z00.00 WELLNESS EXAMINATION: ICD-10-CM

## 2021-07-14 DIAGNOSIS — E11.9 TYPE 2 DIABETES MELLITUS WITHOUT COMPLICATION, WITHOUT LONG-TERM CURRENT USE OF INSULIN (HCC): ICD-10-CM

## 2021-07-14 DIAGNOSIS — Z13.220 LIPID SCREENING: ICD-10-CM

## 2021-07-14 LAB
ALBUMIN SERPL-MCNC: 3.6 G/DL (ref 3.4–5)
ALBUMIN/GLOB SERPL: 1.1 {RATIO} (ref 1–2)
ALP LIVER SERPL-CCNC: 62 U/L
ALT SERPL-CCNC: 28 U/L
ANION GAP SERPL CALC-SCNC: 3 MMOL/L (ref 0–18)
AST SERPL-CCNC: 16 U/L (ref 15–37)
BASOPHILS # BLD AUTO: 0.03 X10(3) UL (ref 0–0.2)
BASOPHILS NFR BLD AUTO: 0.5 %
BILIRUB SERPL-MCNC: 0.7 MG/DL (ref 0.1–2)
BUN BLD-MCNC: 12 MG/DL (ref 7–18)
BUN/CREAT SERPL: 13 (ref 10–20)
CALCIUM BLD-MCNC: 8.6 MG/DL (ref 8.5–10.1)
CHLORIDE SERPL-SCNC: 105 MMOL/L (ref 98–112)
CHOLEST SMN-MCNC: 157 MG/DL (ref ?–200)
CO2 SERPL-SCNC: 29 MMOL/L (ref 21–32)
COMPLEXED PSA SERPL-MCNC: 4.1 NG/ML (ref ?–4)
CREAT BLD-MCNC: 0.92 MG/DL
CREAT UR-SCNC: 136 MG/DL
DEPRECATED RDW RBC AUTO: 41.3 FL (ref 35.1–46.3)
EOSINOPHIL # BLD AUTO: 0.15 X10(3) UL (ref 0–0.7)
EOSINOPHIL NFR BLD AUTO: 2.6 %
ERYTHROCYTE [DISTWIDTH] IN BLOOD BY AUTOMATED COUNT: 12.7 % (ref 11–15)
EST. AVERAGE GLUCOSE BLD GHB EST-MCNC: 151 MG/DL (ref 68–126)
GLOBULIN PLAS-MCNC: 3.4 G/DL (ref 2.8–4.4)
GLUCOSE BLD-MCNC: 150 MG/DL (ref 70–99)
HBA1C MFR BLD HPLC: 6.9 % (ref ?–5.7)
HCT VFR BLD AUTO: 41.9 %
HDLC SERPL-MCNC: 40 MG/DL (ref 40–59)
HGB BLD-MCNC: 14 G/DL
IMM GRANULOCYTES # BLD AUTO: 0.01 X10(3) UL (ref 0–1)
IMM GRANULOCYTES NFR BLD: 0.2 %
LDLC SERPL CALC-MCNC: 105 MG/DL (ref ?–100)
LYMPHOCYTES # BLD AUTO: 1.56 X10(3) UL (ref 1–4)
LYMPHOCYTES NFR BLD AUTO: 26.7 %
M PROTEIN MFR SERPL ELPH: 7 G/DL (ref 6.4–8.2)
MCH RBC QN AUTO: 30.4 PG (ref 26–34)
MCHC RBC AUTO-ENTMCNC: 33.4 G/DL (ref 31–37)
MCV RBC AUTO: 91.1 FL
MICROALBUMIN UR-MCNC: 0.5 MG/DL
MICROALBUMIN/CREAT 24H UR-RTO: 3.7 UG/MG (ref ?–30)
MONOCYTES # BLD AUTO: 0.47 X10(3) UL (ref 0.1–1)
MONOCYTES NFR BLD AUTO: 8 %
NEUTROPHILS # BLD AUTO: 3.63 X10 (3) UL (ref 1.5–7.7)
NEUTROPHILS # BLD AUTO: 3.63 X10(3) UL (ref 1.5–7.7)
NEUTROPHILS NFR BLD AUTO: 62 %
NONHDLC SERPL-MCNC: 117 MG/DL (ref ?–130)
OSMOLALITY SERPL CALC.SUM OF ELEC: 287 MOSM/KG (ref 275–295)
PATIENT FASTING Y/N/NP: YES
PATIENT FASTING Y/N/NP: YES
PLATELET # BLD AUTO: 286 10(3)UL (ref 150–450)
POTASSIUM SERPL-SCNC: 4.3 MMOL/L (ref 3.5–5.1)
RBC # BLD AUTO: 4.6 X10(6)UL
SODIUM SERPL-SCNC: 137 MMOL/L (ref 136–145)
TRIGL SERPL-MCNC: 61 MG/DL (ref 30–149)
VLDLC SERPL CALC-MCNC: 10 MG/DL (ref 0–30)
WBC # BLD AUTO: 5.9 X10(3) UL (ref 4–11)

## 2021-07-14 PROCEDURE — 80061 LIPID PANEL: CPT

## 2021-07-14 PROCEDURE — 36415 COLL VENOUS BLD VENIPUNCTURE: CPT

## 2021-07-14 PROCEDURE — 85025 COMPLETE CBC W/AUTO DIFF WBC: CPT

## 2021-07-14 PROCEDURE — 83036 HEMOGLOBIN GLYCOSYLATED A1C: CPT

## 2021-07-14 PROCEDURE — 82570 ASSAY OF URINE CREATININE: CPT

## 2021-07-14 PROCEDURE — 80053 COMPREHEN METABOLIC PANEL: CPT

## 2021-07-14 PROCEDURE — 82043 UR ALBUMIN QUANTITATIVE: CPT

## 2021-09-27 ENCOUNTER — OFFICE VISIT (OUTPATIENT)
Dept: SURGERY | Facility: CLINIC | Age: 66
End: 2021-09-27
Payer: COMMERCIAL

## 2021-09-27 DIAGNOSIS — N40.0 ENLARGED PROSTATE: ICD-10-CM

## 2021-09-27 DIAGNOSIS — R97.20 ELEVATED PSA: ICD-10-CM

## 2021-09-27 DIAGNOSIS — R82.90 URINE FINDING: Primary | ICD-10-CM

## 2021-09-27 LAB
APPEARANCE: CLEAR
BILIRUBIN: NEGATIVE
GLUCOSE (URINE DIPSTICK): NEGATIVE MG/DL
KETONES (URINE DIPSTICK): NEGATIVE MG/DL
LEUKOCYTES: NEGATIVE
MULTISTIX LOT#: NORMAL NUMERIC
NITRITE, URINE: NEGATIVE
OCCULT BLOOD: NEGATIVE
PH, URINE: 7 (ref 4.5–8)
PROTEIN (URINE DIPSTICK): NEGATIVE MG/DL
SPECIFIC GRAVITY: 1.02 (ref 1–1.03)
URINE-COLOR: YELLOW
UROBILINOGEN,SEMI-QN: 0.2 MG/DL (ref 0–1.9)

## 2021-09-27 PROCEDURE — 81003 URINALYSIS AUTO W/O SCOPE: CPT | Performed by: UROLOGY

## 2021-09-27 PROCEDURE — 99243 OFF/OP CNSLTJ NEW/EST LOW 30: CPT | Performed by: UROLOGY

## 2021-09-27 NOTE — PATIENT INSTRUCTIONS
On behalf of myself and care team, I would like to thank you for entrusting us with your care today. It is our goal to provide you and your family with excellent care.   Please note that you may receive a survey in the mail; any feedback you have for this What other tests might I have along with this test?  Your healthcare provider may also do a digital rectal exam (LINH).  A LINH is a physical exam of the prostate, not a lab test. For the exam, your provider will place a gloved finger in your rectum and fee vitamins, and supplements you are taking. This includes medicines that don't need a prescription and any illegal drugs you may use. Rebecca last reviewed this educational content on 9/1/2020  © 4976-2126 [employer]. All rights reserved.  Angelica Ellis

## 2021-09-27 NOTE — PROGRESS NOTES
Rooming Clinician:     Raffy Matta is a 61year old male.   Patient presents with:  Consult: elevated PSA    Elevated PSA:  Current PSA: 4.10  PSA History: 2.15  Previous Biopsy: No    Stream: strong  Nocturia 1-2 times  Straining to urinate: No    Empty a Alcohol use: Yes      Comment: social/weekend once per month    Drug use: No       REVIEW OF SYSTEMS:     GENERAL HEALTH: feels well otherwise  SKIN: denies any unusual skin lesions or rashes  RESPIRATORY: denies shortness of breath with exertion  CARDIOVA activity. I discussed other urinary and blood tests that can project the risk of prostate cancer when the PSA is elevated.  I discussed other means of evaluating the prostate including transrectal ultrasonography and needle biopsy of the prostate, 3 julio cesar

## 2021-09-28 ENCOUNTER — TELEPHONE (OUTPATIENT)
Dept: INTERNAL MEDICINE CLINIC | Facility: CLINIC | Age: 66
End: 2021-09-28

## 2021-09-28 NOTE — TELEPHONE ENCOUNTER
Received New Rx pt request from [facility] in [place] [phone]. Fax [phone]. Placed in A. S. bin for review.

## 2021-10-01 DIAGNOSIS — R73.9 HYPERGLYCEMIA: ICD-10-CM

## 2021-10-01 NOTE — TELEPHONE ENCOUNTER
Last VISIT -  6/28/21    Last CPE - 6/28/21    Last REFILL -     ACCU-CHEK GUIDE In Vitro Strip 100 strip 1 12/18/2020     Last LABS - 7/14/21 PSA. LIPID, CBC, CMP    No future appointments. Per PROTOCOL? None    Please Approve or Deny.

## 2021-10-04 RX ORDER — BLOOD SUGAR DIAGNOSTIC
1 STRIP MISCELLANEOUS DAILY
Qty: 100 STRIP | Refills: 1 | Status: SHIPPED | OUTPATIENT
Start: 2021-10-04

## 2021-12-08 ENCOUNTER — IMMUNIZATION (OUTPATIENT)
Dept: LAB | Facility: HOSPITAL | Age: 66
End: 2021-12-08
Attending: EMERGENCY MEDICINE
Payer: COMMERCIAL

## 2021-12-08 DIAGNOSIS — Z23 NEED FOR VACCINATION: Primary | ICD-10-CM

## 2021-12-08 PROCEDURE — 0004A SARSCOV2 VAC 30MCG/0.3ML IM: CPT

## 2021-12-29 NOTE — TELEPHONE ENCOUNTER
Needs refill     METFORMIN HCL  MG Oral Tablet 24 Hr 180 tablet 0 12/23/2020    Sig:   TAKE 2 TABLETS (1,000MG    TOTAL) DAILY WITH BREAKFAST       [facility] - [place], [address]

## 2021-12-30 RX ORDER — METFORMIN HYDROCHLORIDE 500 MG/1
1000 TABLET, EXTENDED RELEASE ORAL
Qty: 180 TABLET | Refills: 0 | Status: SHIPPED | OUTPATIENT
Start: 2021-12-30

## 2021-12-30 RX ORDER — METFORMIN HYDROCHLORIDE 500 MG/1
TABLET, EXTENDED RELEASE ORAL
Qty: 180 TABLET | Refills: 0 | OUTPATIENT
Start: 2021-12-30

## 2021-12-30 NOTE — TELEPHONE ENCOUNTER
Last OV 6/28/21  Last PE 6/28/21  Last REFILL   Medication Quantity Refills Start End   METFORMIN HCL  MG Oral Tablet 24 Hr 180 tablet 0 12/23/2020      Last LABS Psa, microalb, lipid, a1c, cbc, cmp 7/14/21    No future appointments. Per PROTOCOL?  Failed     Please Advise

## 2021-12-30 NOTE — TELEPHONE ENCOUNTER
Last visit- 06/28/2021    Last refill- 12/23/2020 metformin hcl er 500mg [id number] 0R    Last labs- 07/14/2021 psa screen, microalb, lipid, hemoglobin a1c, cbc, cmp    No future appointments.

## 2022-01-11 ENCOUNTER — TELEPHONE (OUTPATIENT)
Dept: INTERNAL MEDICINE CLINIC | Facility: CLINIC | Age: 67
End: 2022-01-11

## 2022-01-12 RX ORDER — BLOOD SUGAR DIAGNOSTIC
1 STRIP MISCELLANEOUS DAILY
Qty: 100 STRIP | Refills: 1 | Status: SHIPPED | OUTPATIENT
Start: 2022-01-12

## 2022-01-24 RX ORDER — BLOOD SUGAR DIAGNOSTIC
STRIP MISCELLANEOUS
Qty: 100 STRIP | Refills: 0 | Status: SHIPPED | OUTPATIENT
Start: 2022-01-24

## 2022-01-24 NOTE — TELEPHONE ENCOUNTER
Pt received the One touch verio strips but it needs to be the One Touch Ultra. Please correct the rx and sent it to the pharmacy. Pt is completely out.

## 2022-01-25 NOTE — TELEPHONE ENCOUNTER
CMA called pharmacy to discuss. Pharmacy tech stating one touch strips received were One Touch Ultra. Did not receive Verio when initially sent. CMA asked pharmacy to change order on file to correct Verio order for pt.

## 2022-01-25 NOTE — TELEPHONE ENCOUNTER
Pt called very upset. He states he needs the One touch Verio Strips and not the ultra. He cannot use the ultra strips. States he will come to office after work to straighten this out.

## 2022-01-25 NOTE — TELEPHONE ENCOUNTER
Verio strips were sent to his [facility] pharmacy on 1/12. Can we please call the pharmacy to confirm or find out the issue. Thanks.

## 2022-01-25 NOTE — TELEPHONE ENCOUNTER
PARTHA called pt to discuss. Pt stated the pharmacy has been giving him the incorrect ones for month and once he takes them, they will not let him return.  PARTHA verified with pt that it should be the Verio strips and that is in fact what we have been previously

## 2022-03-30 ENCOUNTER — MED REC SCAN ONLY (OUTPATIENT)
Dept: INTERNAL MEDICINE CLINIC | Facility: CLINIC | Age: 67
End: 2022-03-30

## 2022-04-26 RX ORDER — BLOOD SUGAR DIAGNOSTIC
STRIP MISCELLANEOUS
Qty: 100 STRIP | Refills: 0 | Status: SHIPPED | OUTPATIENT
Start: 2022-04-26

## 2022-04-26 NOTE — TELEPHONE ENCOUNTER
LVMTCB pt needs to make DM f/up     PASSED per protocol, refill sent. Last PE 6.28.21  No future appointments.

## 2022-04-28 NOTE — TELEPHONE ENCOUNTER
Future Appointments   Date Time Provider Martinez Pettit   5/12/2022  3:40 PM Sanna Mcguire MD [facility]

## 2022-05-12 ENCOUNTER — OFFICE VISIT (OUTPATIENT)
Dept: INTERNAL MEDICINE CLINIC | Facility: CLINIC | Age: 67
End: 2022-05-12
Payer: COMMERCIAL

## 2022-05-12 VITALS
SYSTOLIC BLOOD PRESSURE: 100 MMHG | DIASTOLIC BLOOD PRESSURE: 68 MMHG | WEIGHT: 166.19 LBS | RESPIRATION RATE: 18 BRPM | HEART RATE: 77 BPM | HEIGHT: 67 IN | BODY MASS INDEX: 26.08 KG/M2

## 2022-05-12 DIAGNOSIS — R97.20 ELEVATED PSA: ICD-10-CM

## 2022-05-12 DIAGNOSIS — E11.9 TYPE 2 DIABETES MELLITUS WITHOUT COMPLICATION, WITHOUT LONG-TERM CURRENT USE OF INSULIN (HCC): Primary | ICD-10-CM

## 2022-05-12 LAB
CARTRIDGE LOT#: 889 NUMERIC
HEMOGLOBIN A1C: 6.7 % (ref 4.3–5.6)

## 2022-05-12 PROCEDURE — 3078F DIAST BP <80 MM HG: CPT | Performed by: FAMILY MEDICINE

## 2022-05-12 PROCEDURE — 90677 PCV20 VACCINE IM: CPT | Performed by: FAMILY MEDICINE

## 2022-05-12 PROCEDURE — 99214 OFFICE O/P EST MOD 30 MIN: CPT | Performed by: FAMILY MEDICINE

## 2022-05-12 PROCEDURE — 3008F BODY MASS INDEX DOCD: CPT | Performed by: FAMILY MEDICINE

## 2022-05-12 PROCEDURE — 3074F SYST BP LT 130 MM HG: CPT | Performed by: FAMILY MEDICINE

## 2022-05-12 PROCEDURE — 3044F HG A1C LEVEL LT 7.0%: CPT | Performed by: FAMILY MEDICINE

## 2022-05-12 PROCEDURE — 90471 IMMUNIZATION ADMIN: CPT | Performed by: FAMILY MEDICINE

## 2022-05-12 PROCEDURE — 83036 HEMOGLOBIN GLYCOSYLATED A1C: CPT | Performed by: FAMILY MEDICINE

## 2022-05-12 RX ORDER — ROSUVASTATIN CALCIUM 10 MG/1
10 TABLET, COATED ORAL NIGHTLY
Qty: 90 TABLET | Refills: 0 | Status: SHIPPED | OUTPATIENT
Start: 2022-05-12

## 2022-06-20 ENCOUNTER — LAB ENCOUNTER (OUTPATIENT)
Dept: LAB | Facility: HOSPITAL | Age: 67
End: 2022-06-20
Attending: FAMILY MEDICINE
Payer: COMMERCIAL

## 2022-06-20 DIAGNOSIS — E11.9 TYPE 2 DIABETES MELLITUS WITHOUT COMPLICATION, WITHOUT LONG-TERM CURRENT USE OF INSULIN (HCC): ICD-10-CM

## 2022-06-20 LAB
ALBUMIN SERPL-MCNC: 3.8 G/DL (ref 3.4–5)
ALBUMIN/GLOB SERPL: 1 {RATIO} (ref 1–2)
ALP LIVER SERPL-CCNC: 82 U/L
ALT SERPL-CCNC: 46 U/L
ANION GAP SERPL CALC-SCNC: 7 MMOL/L (ref 0–18)
AST SERPL-CCNC: 18 U/L (ref 15–37)
BILIRUB SERPL-MCNC: 0.8 MG/DL (ref 0.1–2)
BUN BLD-MCNC: 14 MG/DL (ref 7–18)
CALCIUM BLD-MCNC: 9.2 MG/DL (ref 8.5–10.1)
CHLORIDE SERPL-SCNC: 103 MMOL/L (ref 98–112)
CHOLEST SERPL-MCNC: 110 MG/DL (ref ?–200)
CO2 SERPL-SCNC: 26 MMOL/L (ref 21–32)
CREAT BLD-MCNC: 0.91 MG/DL
CREAT UR-SCNC: 87.8 MG/DL
FASTING PATIENT LIPID ANSWER: YES
FASTING STATUS PATIENT QL REPORTED: YES
GLOBULIN PLAS-MCNC: 3.7 G/DL (ref 2.8–4.4)
GLUCOSE BLD-MCNC: 110 MG/DL (ref 70–99)
HDLC SERPL-MCNC: 40 MG/DL (ref 40–59)
LDLC SERPL CALC-MCNC: 59 MG/DL (ref ?–100)
MICROALBUMIN UR-MCNC: 0.56 MG/DL
MICROALBUMIN/CREAT 24H UR-RTO: 6.4 UG/MG (ref ?–30)
NONHDLC SERPL-MCNC: 70 MG/DL (ref ?–130)
OSMOLALITY SERPL CALC.SUM OF ELEC: 283 MOSM/KG (ref 275–295)
POTASSIUM SERPL-SCNC: 3.6 MMOL/L (ref 3.5–5.1)
PROT SERPL-MCNC: 7.5 G/DL (ref 6.4–8.2)
PSA SERPL-MCNC: 3.21 NG/ML (ref ?–4)
SODIUM SERPL-SCNC: 136 MMOL/L (ref 136–145)
TRIGL SERPL-MCNC: 44 MG/DL (ref 30–149)
VLDLC SERPL CALC-MCNC: 6 MG/DL (ref 0–30)

## 2022-06-20 PROCEDURE — 36415 COLL VENOUS BLD VENIPUNCTURE: CPT | Performed by: UROLOGY

## 2022-06-20 PROCEDURE — 3061F NEG MICROALBUMINURIA REV: CPT | Performed by: FAMILY MEDICINE

## 2022-06-20 PROCEDURE — 84153 ASSAY OF PSA TOTAL: CPT | Performed by: UROLOGY

## 2022-06-20 PROCEDURE — 80061 LIPID PANEL: CPT

## 2022-06-20 PROCEDURE — 82570 ASSAY OF URINE CREATININE: CPT

## 2022-06-20 PROCEDURE — 80053 COMPREHEN METABOLIC PANEL: CPT

## 2022-06-20 PROCEDURE — 82043 UR ALBUMIN QUANTITATIVE: CPT

## 2022-08-09 DIAGNOSIS — E11.9 TYPE 2 DIABETES MELLITUS WITHOUT COMPLICATION, WITHOUT LONG-TERM CURRENT USE OF INSULIN (HCC): ICD-10-CM

## 2022-08-09 NOTE — TELEPHONE ENCOUNTER
PASSED per protocol, refill sent.   Last PE 6.28.21-PE scheduled for 8.12.22   Future Appointments   Date Time Provider Martinez Pettit   8/12/2022  3:20 PM Rosa Espinal MD [facility]

## 2022-08-11 RX ORDER — ROSUVASTATIN CALCIUM 10 MG/1
TABLET, COATED ORAL
Qty: 90 TABLET | Refills: 0 | Status: SHIPPED | OUTPATIENT
Start: 2022-08-11

## 2022-08-12 ENCOUNTER — OFFICE VISIT (OUTPATIENT)
Dept: INTERNAL MEDICINE CLINIC | Facility: CLINIC | Age: 67
End: 2022-08-12
Payer: COMMERCIAL

## 2022-08-12 VITALS
TEMPERATURE: 98 F | WEIGHT: 165.63 LBS | RESPIRATION RATE: 18 BRPM | HEIGHT: 67 IN | DIASTOLIC BLOOD PRESSURE: 70 MMHG | SYSTOLIC BLOOD PRESSURE: 118 MMHG | BODY MASS INDEX: 26 KG/M2 | HEART RATE: 82 BPM

## 2022-08-12 DIAGNOSIS — R35.1 BENIGN PROSTATIC HYPERPLASIA WITH NOCTURIA: ICD-10-CM

## 2022-08-12 DIAGNOSIS — N40.1 BENIGN PROSTATIC HYPERPLASIA WITH NOCTURIA: ICD-10-CM

## 2022-08-12 DIAGNOSIS — E11.9 TYPE 2 DIABETES MELLITUS WITHOUT COMPLICATION, WITHOUT LONG-TERM CURRENT USE OF INSULIN (HCC): ICD-10-CM

## 2022-08-12 DIAGNOSIS — R97.20 ELEVATED PSA: ICD-10-CM

## 2022-08-12 DIAGNOSIS — Z00.00 WELLNESS EXAMINATION: Primary | ICD-10-CM

## 2022-08-12 PROCEDURE — 3074F SYST BP LT 130 MM HG: CPT | Performed by: FAMILY MEDICINE

## 2022-08-12 PROCEDURE — 3008F BODY MASS INDEX DOCD: CPT | Performed by: FAMILY MEDICINE

## 2022-08-12 PROCEDURE — 3078F DIAST BP <80 MM HG: CPT | Performed by: FAMILY MEDICINE

## 2022-08-12 PROCEDURE — 99397 PER PM REEVAL EST PAT 65+ YR: CPT | Performed by: FAMILY MEDICINE

## 2022-08-12 PROCEDURE — 90471 IMMUNIZATION ADMIN: CPT | Performed by: FAMILY MEDICINE

## 2022-08-12 PROCEDURE — 90750 HZV VACC RECOMBINANT IM: CPT | Performed by: FAMILY MEDICINE

## 2022-08-12 RX ORDER — BLOOD SUGAR DIAGNOSTIC
STRIP MISCELLANEOUS
Qty: 100 STRIP | Refills: 1 | Status: SHIPPED | OUTPATIENT
Start: 2022-08-12 | End: 2023-08-12

## 2022-08-26 ENCOUNTER — TELEPHONE (OUTPATIENT)
Dept: INTERNAL MEDICINE CLINIC | Facility: CLINIC | Age: 67
End: 2022-08-26

## 2022-08-26 DIAGNOSIS — E11.9 TYPE 2 DIABETES MELLITUS WITHOUT COMPLICATION, WITHOUT LONG-TERM CURRENT USE OF INSULIN (HCC): Primary | ICD-10-CM

## 2022-08-26 RX ORDER — BLOOD SUGAR DIAGNOSTIC
STRIP MISCELLANEOUS
Qty: 100 STRIP | Refills: 0 | Status: SHIPPED | OUTPATIENT
Start: 2022-08-26

## 2022-08-26 NOTE — TELEPHONE ENCOUNTER
Which pharmacy:[facility]. Prescription Refill Request - Patient advised can take 48-72 hours.       Name of Medication (strength, dose, qty requested:     Patient needs the monitor to go with the [address] [place] [address] In Vitro Strips  Patient did not have the name

## 2022-08-31 NOTE — TELEPHONE ENCOUNTER
Which pharmacy:  [facility]    Prescription Refill Request - Patient advised can take 48-72 hours.       Name of Medication (strength, dose, qty requested:     Pt need new rx for One touch verio flex glucose monitor sent in

## 2022-09-01 RX ORDER — BLOOD-GLUCOSE METER
EACH MISCELLANEOUS
Qty: 1 KIT | Refills: 0 | Status: SHIPPED | OUTPATIENT
Start: 2022-09-01

## 2022-09-01 NOTE — TELEPHONE ENCOUNTER
PASSED per protocol, refill sent.   Last PE 8.12.22  Future Appointments   Date Time Provider Martinez Hodan   9/13/2022  6:15 PM Srikanth Coppola MD [facility]   11/21/2022  3:20 PM Araceli Dueñas MD [facility]

## 2022-09-13 ENCOUNTER — OFFICE VISIT (OUTPATIENT)
Dept: SURGERY | Facility: CLINIC | Age: 67
End: 2022-09-13
Payer: COMMERCIAL

## 2022-09-13 DIAGNOSIS — N48.6 PEYRONIE'S DISEASE: ICD-10-CM

## 2022-09-13 DIAGNOSIS — R82.90 URINE FINDING: Primary | ICD-10-CM

## 2022-09-13 DIAGNOSIS — N40.0 ENLARGED PROSTATE: ICD-10-CM

## 2022-09-13 LAB
APPEARANCE: CLEAR
BILIRUBIN: NEGATIVE
GLUCOSE (URINE DIPSTICK): NEGATIVE MG/DL
KETONES (URINE DIPSTICK): NEGATIVE MG/DL
LEUKOCYTES: NEGATIVE
MULTISTIX LOT#: NORMAL NUMERIC
NITRITE, URINE: NEGATIVE
OCCULT BLOOD: NEGATIVE
PH, URINE: 5 (ref 4.5–8)
PROTEIN (URINE DIPSTICK): NEGATIVE MG/DL
SPECIFIC GRAVITY: 1 (ref 1–1.03)
URINE-COLOR: YELLOW
UROBILINOGEN,SEMI-QN: 0.2 MG/DL (ref 0–1.9)

## 2022-09-13 PROCEDURE — 99213 OFFICE O/P EST LOW 20 MIN: CPT | Performed by: UROLOGY

## 2022-09-13 PROCEDURE — 81003 URINALYSIS AUTO W/O SCOPE: CPT | Performed by: UROLOGY

## 2022-09-14 ENCOUNTER — TELEPHONE (OUTPATIENT)
Dept: INTERNAL MEDICINE CLINIC | Facility: CLINIC | Age: 67
End: 2022-09-14

## 2022-09-14 NOTE — TELEPHONE ENCOUNTER
Which pharmacy:  [facility]    Prescription Refill Request - Patient advised can take 48-72 hours.       Name of Medication (strength, dose, qty requested:     metFORMIN HCl  MG Oral Tablet 24 Hr      Pt states [facility] mail order sent us request but I do not see in system

## 2022-09-16 ENCOUNTER — TELEPHONE (OUTPATIENT)
Dept: SURGERY | Facility: CLINIC | Age: 67
End: 2022-09-16

## 2022-09-16 RX ORDER — METFORMIN HYDROCHLORIDE 500 MG/1
1000 TABLET, EXTENDED RELEASE ORAL
Qty: 180 TABLET | Refills: 1 | Status: SHIPPED | OUTPATIENT
Start: 2022-09-16 | End: 2022-09-16

## 2022-09-16 RX ORDER — METFORMIN HYDROCHLORIDE 500 MG/1
1000 TABLET, EXTENDED RELEASE ORAL
Qty: 60 TABLET | Refills: 0 | Status: SHIPPED | OUTPATIENT
Start: 2022-09-16

## 2022-09-16 NOTE — TELEPHONE ENCOUNTER
Patient calling needs to have medication sent to local pharmacy as he is out of medication and wont receive delivery from [facility] for several more days.

## 2022-09-16 NOTE — TELEPHONE ENCOUNTER
Patient is calling on this because he is out of this medication.     Please call [facility]  [phone]

## 2022-09-16 NOTE — TELEPHONE ENCOUNTER
PASSED per protocol, refill sent.   Last PE 8.12.22  Future Appointments   Date Time Provider Martinez Hodan   10/18/2022  4:45 PM Mayo Kellogg MD [facility]   11/21/2022  3:20 PM Mounika Benitez MD [facility]

## 2022-09-16 NOTE — TELEPHONE ENCOUNTER
Last OV: 8/12/22 annual PE    Future Appointments   Date Time Provider Martinez Chandi   10/18/2022  4:45 PM Srikanth Coppola MD [facility] 4   11/21/2022  3:20 PM Araceli Dueñas MD [facility]        Latest labs: 6/20/22 Microalb, Lipid, CMP and PSA 5/12/22 A1c    Latest RX: metformin-180 tabs 0 refills on 12/30/21    Per protocol, not on protocol. Rx pending.

## 2022-10-11 RX ORDER — METFORMIN HYDROCHLORIDE 500 MG/1
TABLET, EXTENDED RELEASE ORAL
Qty: 60 TABLET | Refills: 0 | Status: SHIPPED | OUTPATIENT
Start: 2022-10-11

## 2022-10-11 NOTE — TELEPHONE ENCOUNTER
PASSED per protocol, refill sent.   Last PE 8.12.22   Future Appointments   Date Time Provider Martinez Hodan   10/18/2022  4:45 PM Lavell Shah MD [facility]   11/21/2022  3:20 PM Britton Ellis MD [facility]

## 2022-10-18 ENCOUNTER — OFFICE VISIT (OUTPATIENT)
Dept: SURGERY | Facility: CLINIC | Age: 67
End: 2022-10-18
Payer: COMMERCIAL

## 2022-10-18 DIAGNOSIS — R39.9 LOWER URINARY TRACT SYMPTOMS: ICD-10-CM

## 2022-10-18 DIAGNOSIS — N48.6 PEYRONIE'S DISEASE: Primary | ICD-10-CM

## 2022-10-18 DIAGNOSIS — N40.0 ENLARGED PROSTATE: ICD-10-CM

## 2022-10-18 LAB
APPEARANCE: CLEAR
BILIRUBIN: NEGATIVE
GLUCOSE (URINE DIPSTICK): NEGATIVE MG/DL
KETONES (URINE DIPSTICK): NEGATIVE MG/DL
LEUKOCYTES: NEGATIVE
MULTISTIX LOT#: NORMAL NUMERIC
NITRITE, URINE: NEGATIVE
OCCULT BLOOD: NEGATIVE
PH, URINE: 6 (ref 4.5–8)
PROTEIN (URINE DIPSTICK): NEGATIVE MG/DL
SPECIFIC GRAVITY: 1.01 (ref 1–1.03)
URINE-COLOR: YELLOW
UROBILINOGEN,SEMI-QN: 0.2 MG/DL (ref 0–1.9)

## 2022-10-18 PROCEDURE — 81003 URINALYSIS AUTO W/O SCOPE: CPT | Performed by: UROLOGY

## 2022-10-18 PROCEDURE — 99213 OFFICE O/P EST LOW 20 MIN: CPT | Performed by: UROLOGY

## 2022-10-29 ENCOUNTER — TELEPHONE (OUTPATIENT)
Dept: INTERNAL MEDICINE CLINIC | Facility: CLINIC | Age: 67
End: 2022-10-29

## 2022-11-11 DIAGNOSIS — E11.9 TYPE 2 DIABETES MELLITUS WITHOUT COMPLICATION, WITHOUT LONG-TERM CURRENT USE OF INSULIN (HCC): ICD-10-CM

## 2022-11-14 RX ORDER — ROSUVASTATIN CALCIUM 10 MG/1
TABLET, COATED ORAL
Qty: 90 TABLET | Refills: 0 | Status: SHIPPED | OUTPATIENT
Start: 2022-11-14

## 2022-11-21 ENCOUNTER — OFFICE VISIT (OUTPATIENT)
Dept: INTERNAL MEDICINE CLINIC | Facility: CLINIC | Age: 67
End: 2022-11-21
Payer: COMMERCIAL

## 2022-11-21 VITALS
SYSTOLIC BLOOD PRESSURE: 118 MMHG | TEMPERATURE: 98 F | HEIGHT: 67 IN | WEIGHT: 168 LBS | DIASTOLIC BLOOD PRESSURE: 64 MMHG | BODY MASS INDEX: 26.37 KG/M2 | HEART RATE: 86 BPM

## 2022-11-21 DIAGNOSIS — R97.20 ELEVATED PSA: ICD-10-CM

## 2022-11-21 DIAGNOSIS — N48.6 PEYRONIE DISEASE: ICD-10-CM

## 2022-11-21 DIAGNOSIS — E11.9 TYPE 2 DIABETES MELLITUS WITHOUT COMPLICATION, WITHOUT LONG-TERM CURRENT USE OF INSULIN (HCC): Primary | ICD-10-CM

## 2022-11-21 PROBLEM — R73.9 HYPERGLYCEMIA: Status: RESOLVED | Noted: 2017-08-29 | Resolved: 2022-11-21

## 2022-11-21 LAB
CARTRIDGE LOT#: 924 NUMERIC
HEMOGLOBIN A1C: 6.9 % (ref 4.3–5.6)

## 2022-11-21 RX ORDER — METFORMIN HYDROCHLORIDE 500 MG/1
1000 TABLET, EXTENDED RELEASE ORAL
Qty: 180 TABLET | Refills: 1 | Status: SHIPPED | OUTPATIENT
Start: 2022-11-21 | End: 2023-02-19

## 2022-11-21 RX ORDER — ROSUVASTATIN CALCIUM 10 MG/1
10 TABLET, COATED ORAL NIGHTLY
Qty: 90 TABLET | Refills: 1 | Status: SHIPPED | OUTPATIENT
Start: 2022-11-21

## 2022-11-28 ENCOUNTER — TELEPHONE (OUTPATIENT)
Dept: SURGERY | Facility: CLINIC | Age: 67
End: 2022-11-28

## 2022-11-28 NOTE — TELEPHONE ENCOUNTER
Patient dropped off a small gold envelope for dr TERAN - placed small gold envelope on drs rocky. 11-28-22.

## 2023-03-12 ENCOUNTER — APPOINTMENT (OUTPATIENT)
Dept: GENERAL RADIOLOGY | Age: 68
End: 2023-03-12
Attending: NURSE PRACTITIONER
Payer: COMMERCIAL

## 2023-03-12 ENCOUNTER — HOSPITAL ENCOUNTER (OUTPATIENT)
Age: 68
Discharge: HOME | End: 2023-03-12
Payer: COMMERCIAL

## 2023-03-12 VITALS
TEMPERATURE: 98 F | HEIGHT: 67 IN | OXYGEN SATURATION: 99 % | WEIGHT: 165 LBS | HEART RATE: 75 BPM | SYSTOLIC BLOOD PRESSURE: 155 MMHG | BODY MASS INDEX: 25.9 KG/M2 | RESPIRATION RATE: 17 BRPM | DIASTOLIC BLOOD PRESSURE: 72 MMHG

## 2023-03-12 DIAGNOSIS — M54.31 SCIATICA OF RIGHT SIDE: Primary | ICD-10-CM

## 2023-03-12 PROCEDURE — 99213 OFFICE O/P EST LOW 20 MIN: CPT | Performed by: NURSE PRACTITIONER

## 2023-03-12 PROCEDURE — 72110 X-RAY EXAM L-2 SPINE 4/>VWS: CPT | Performed by: NURSE PRACTITIONER

## 2023-03-12 RX ORDER — NAPROXEN 500 MG/1
500 TABLET ORAL 2 TIMES DAILY PRN
Qty: 14 TABLET | Refills: 0 | Status: SHIPPED | OUTPATIENT
Start: 2023-03-12 | End: 2023-03-19

## 2023-03-12 NOTE — DISCHARGE INSTRUCTIONS
Take naproxen as needed for pain. Apply lidocaine and Salonpas patches to area. Close follow-up with your primary care doctor in case further imaging is needed of your spine. You should also have a evaluation for osteoporosis and bone demineralization.

## 2023-03-12 NOTE — ED INITIAL ASSESSMENT (HPI)
Pt wakes up in the am and his rt hip/buttock area is painful, then he stretched and takes ibuprofen and it goes away.   It has been going on for the past 5-6 days

## 2023-03-19 ENCOUNTER — HOSPITAL ENCOUNTER (OUTPATIENT)
Age: 68
Discharge: HOME | End: 2023-03-19
Payer: COMMERCIAL

## 2023-03-19 VITALS
DIASTOLIC BLOOD PRESSURE: 84 MMHG | BODY MASS INDEX: 26 KG/M2 | HEART RATE: 82 BPM | RESPIRATION RATE: 16 BRPM | TEMPERATURE: 98 F | SYSTOLIC BLOOD PRESSURE: 150 MMHG | OXYGEN SATURATION: 98 % | WEIGHT: 164.88 LBS

## 2023-03-19 DIAGNOSIS — M54.31 SCIATICA OF RIGHT SIDE: Primary | ICD-10-CM

## 2023-03-19 RX ORDER — NAPROXEN 500 MG/1
500 TABLET ORAL 2 TIMES DAILY PRN
Qty: 20 TABLET | Refills: 0 | Status: SHIPPED | OUTPATIENT
Start: 2023-03-19 | End: 2023-03-26

## 2023-03-19 NOTE — ED INITIAL ASSESSMENT (HPI)
Seen @ [facility] 3/12 for right hip pain,  Ran out of rx, cannot get appointment w PCP. Requesting refill Naproxen.

## 2023-03-21 ENCOUNTER — OFFICE VISIT (OUTPATIENT)
Dept: INTERNAL MEDICINE CLINIC | Facility: CLINIC | Age: 68
End: 2023-03-21
Payer: COMMERCIAL

## 2023-03-21 VITALS
DIASTOLIC BLOOD PRESSURE: 78 MMHG | RESPIRATION RATE: 18 BRPM | WEIGHT: 164 LBS | SYSTOLIC BLOOD PRESSURE: 138 MMHG | HEART RATE: 82 BPM | HEIGHT: 67 IN | OXYGEN SATURATION: 98 % | BODY MASS INDEX: 25.74 KG/M2

## 2023-03-21 DIAGNOSIS — M54.16 LUMBAR RADICULITIS: Primary | ICD-10-CM

## 2023-03-21 DIAGNOSIS — N48.6 PEYRONIE DISEASE: ICD-10-CM

## 2023-03-21 PROCEDURE — 99214 OFFICE O/P EST MOD 30 MIN: CPT | Performed by: FAMILY MEDICINE

## 2023-03-21 PROCEDURE — 3008F BODY MASS INDEX DOCD: CPT | Performed by: FAMILY MEDICINE

## 2023-03-21 PROCEDURE — 3075F SYST BP GE 130 - 139MM HG: CPT | Performed by: FAMILY MEDICINE

## 2023-03-21 PROCEDURE — 3078F DIAST BP <80 MM HG: CPT | Performed by: FAMILY MEDICINE

## 2023-03-21 RX ORDER — CYCLOBENZAPRINE HCL 10 MG
10 TABLET ORAL NIGHTLY PRN
Qty: 30 TABLET | Refills: 0 | Status: SHIPPED | OUTPATIENT
Start: 2023-03-21

## 2023-03-21 RX ORDER — METHYLPREDNISOLONE 4 MG/1
TABLET ORAL
Qty: 1 EACH | Refills: 0 | Status: SHIPPED | OUTPATIENT
Start: 2023-03-21

## 2023-03-22 ENCOUNTER — TELEPHONE (OUTPATIENT)
Dept: PHYSICAL THERAPY | Facility: HOSPITAL | Age: 68
End: 2023-03-22

## 2023-03-24 ENCOUNTER — TELEPHONE (OUTPATIENT)
Dept: INTERNAL MEDICINE CLINIC | Facility: CLINIC | Age: 68
End: 2023-03-24

## 2023-03-24 DIAGNOSIS — M54.16 LUMBAR RADICULITIS: Primary | ICD-10-CM

## 2023-03-24 NOTE — TELEPHONE ENCOUNTER
LMTCB 3/24 to clarify what pt is looking for. Other recs from [facility] given not improving? Is 175/180 what pt's blood sugar has been around?

## 2023-03-24 NOTE — TELEPHONE ENCOUNTER
Pt returned call. Pt said that he was seen by [facility] PT on 3/22, but next appt they had was not until middle of April. Pt was seen at [facility] in [place] today for PT and they already gave him full schedule. Pt also said that his BG is elevated (around 175-180 in am; baseline is 115-120). None of the meds prescribed seem to be helping sxs. Pt asking if he should stop meds. Informed him medrol dose pack is a taper so should not just stop. Will discuss concerned with BG with [facility] and get back to him with recs. Pt said that he believes he needs some time off of work. 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 He is having difficulty doing this. Asking if [facility] would fill our short term disability papers until he can get sxs under control. Even having a hard time driving as it is painful when he pushes down on peddle. [facility], ok to change PT order to external? Any med changes due to increase in glucose?  Would you consider short term disability for pt?

## 2023-03-24 NOTE — TELEPHONE ENCOUNTER
Can continue medrol dose pack for full course. Can also use extra strength tylenol and after he completes MDP he can try aleve. [id number] [address]itt for external. Yes will completed paperwork.

## 2023-03-24 NOTE — TELEPHONE ENCOUNTER
External PT order placed. Faxed to [employer]. Confirmation received. Called and spoke to pt. Informed him PT order has been sent. Complete medrol dose pack for full course. BG only temporary elevated to to dose pack. Can take extra strength tylenol and aleve after he compete medrol dose pack. Informed him we can complete paperwork once he gets this from his work. Pt said he will have it faxed here. Fax number provided. Stated understanding and agreed to all recs.

## 2023-03-24 NOTE — TELEPHONE ENCOUNTER
Patient saw [facility] regarding a pinched nerve in R leg. Patient cannot walk from the parking lot at work to workplace. Pain in leg has gotten worse. It's hard for him to even press the gas peddle in the car without pain. Patient cannot do what he normally does at work. Patient needs to take time off at work. Patient was given 2 different medications. The patient states the medications have not helped and that it only made his sugars very high; 175/180. Patient called PT and is going over by where he lives at 2:00pm today.

## 2023-03-27 NOTE — TELEPHONE ENCOUNTER
Pt call stated forms will be faxed to us soon and he will like to know what chiropractic mk recommends

## 2023-03-28 ENCOUNTER — TELEPHONE (OUTPATIENT)
Dept: INTERNAL MEDICINE CLINIC | Facility: CLINIC | Age: 68
End: 2023-03-28

## 2023-03-28 ENCOUNTER — HOSPITAL ENCOUNTER (EMERGENCY)
Facility: HOSPITAL | Age: 68
Discharge: HOME | End: 2023-03-28
Attending: EMERGENCY MEDICINE
Payer: COMMERCIAL

## 2023-03-28 VITALS
TEMPERATURE: 98 F | RESPIRATION RATE: 18 BRPM | BODY MASS INDEX: 25.9 KG/M2 | WEIGHT: 165 LBS | OXYGEN SATURATION: 99 % | SYSTOLIC BLOOD PRESSURE: 158 MMHG | HEART RATE: 80 BPM | DIASTOLIC BLOOD PRESSURE: 98 MMHG | HEIGHT: 67 IN

## 2023-03-28 DIAGNOSIS — M54.31 SCIATICA OF RIGHT SIDE: Primary | ICD-10-CM

## 2023-03-28 LAB
BILIRUB UR QL STRIP.AUTO: NEGATIVE
GLUCOSE BLD-MCNC: 218 MG/DL (ref 70–99)
GLUCOSE UR STRIP.AUTO-MCNC: NEGATIVE MG/DL
KETONES UR STRIP.AUTO-MCNC: NEGATIVE MG/DL
LEUKOCYTE ESTERASE UR QL STRIP.AUTO: NEGATIVE
NITRITE UR QL STRIP.AUTO: NEGATIVE
PH UR STRIP.AUTO: 6 [PH] (ref 5–8)
PROT UR STRIP.AUTO-MCNC: NEGATIVE MG/DL
RBC UR QL AUTO: NEGATIVE
SP GR UR STRIP.AUTO: 1.01 (ref 1–1.03)
UROBILINOGEN UR STRIP.AUTO-MCNC: <2 MG/DL

## 2023-03-28 PROCEDURE — 81001 URINALYSIS AUTO W/SCOPE: CPT | Performed by: EMERGENCY MEDICINE

## 2023-03-28 PROCEDURE — 82962 GLUCOSE BLOOD TEST: CPT

## 2023-03-28 PROCEDURE — 99285 EMERGENCY DEPT VISIT HI MDM: CPT

## 2023-03-28 PROCEDURE — 96372 THER/PROPH/DIAG INJ SC/IM: CPT

## 2023-03-28 PROCEDURE — 99283 EMERGENCY DEPT VISIT LOW MDM: CPT

## 2023-03-28 RX ORDER — HYDROCODONE BITARTRATE AND ACETAMINOPHEN 5; 325 MG/1; MG/1
1 TABLET ORAL EVERY 8 HOURS PRN
Qty: 10 TABLET | Refills: 0 | Status: SHIPPED | OUTPATIENT
Start: 2023-03-28 | End: 2023-03-28

## 2023-03-28 RX ORDER — HYDROCODONE BITARTRATE AND ACETAMINOPHEN 5; 325 MG/1; MG/1
1 TABLET ORAL ONCE
Status: COMPLETED | OUTPATIENT
Start: 2023-03-28 | End: 2023-03-28

## 2023-03-28 RX ORDER — DIAZEPAM 5 MG/1
5 TABLET ORAL ONCE
Status: COMPLETED | OUTPATIENT
Start: 2023-03-28 | End: 2023-03-28

## 2023-03-28 RX ORDER — HYDROCODONE BITARTRATE AND ACETAMINOPHEN 10; 325 MG/1; MG/1
0.5 TABLET ORAL EVERY 8 HOURS PRN
Qty: 6 TABLET | Refills: 0 | Status: SHIPPED | OUTPATIENT
Start: 2023-03-28 | End: 2023-04-02

## 2023-03-28 RX ORDER — DIAZEPAM 2 MG/1
2 TABLET ORAL EVERY 12 HOURS PRN
Qty: 10 TABLET | Refills: 0 | Status: SHIPPED | OUTPATIENT
Start: 2023-03-28 | End: 2023-04-04

## 2023-03-28 RX ORDER — KETOROLAC TROMETHAMINE 15 MG/ML
15 INJECTION, SOLUTION INTRAMUSCULAR; INTRAVENOUS ONCE
Status: COMPLETED | OUTPATIENT
Start: 2023-03-28 | End: 2023-03-28

## 2023-03-28 NOTE — ED INITIAL ASSESSMENT (HPI)
Pt here due to right sciatic pain, pt taking meds but not helping. He has been to [facility] twice and his PCP and nothing is helping.

## 2023-03-28 NOTE — TELEPHONE ENCOUNTER
Please let me know if you need me to add anything else or make any changes.      Forms received for completion:    __ Disability / Brooklyn Gutierrez received in person    _X__Disabilty/FMLA received via fax     NO__ KAYLA attached    _NO_ Payment collected    _X_ Forms scanned to self then forwarded to \"Forms Dept\"    _X_  Originals placed in a interoffice envelope addressed to [employer] Floor

## 2023-03-29 NOTE — TELEPHONE ENCOUNTER
Disab forms received and logged for processing.  [employer] message sent to pt regarding missing auth

## 2023-04-01 ENCOUNTER — TELEPHONE (OUTPATIENT)
Dept: INTERNAL MEDICINE CLINIC | Facility: CLINIC | Age: 68
End: 2023-04-01

## 2023-04-01 DIAGNOSIS — E11.9 TYPE 2 DIABETES MELLITUS WITHOUT COMPLICATION, WITHOUT LONG-TERM CURRENT USE OF INSULIN (HCC): ICD-10-CM

## 2023-04-03 RX ORDER — ROSUVASTATIN CALCIUM 10 MG/1
TABLET, COATED ORAL
Qty: 90 TABLET | Refills: 1 | Status: SHIPPED | OUTPATIENT
Start: 2023-04-03

## 2023-04-04 NOTE — TELEPHONE ENCOUNTER
Pt first date off work was 3/27/23. Rtw is pending completion of PT/recovery. He called to check status of forms. Informed him of our turn around time and to respond to [employer]. Pt states call his wife once forms are complete. She is listed as alternate contact on demographic. Doxepin Counseling:  Patient advised that the medication is sedating and not to drive a car after taking this medication. Patient informed of potential adverse effects including but not limited to dry mouth, urinary retention, and blurry vision.  The patient verbalized understanding of the proper use and possible adverse effects of doxepin.  All of the patient's questions and concerns were addressed.

## 2023-04-04 NOTE — TELEPHONE ENCOUNTER
Pt called stating his employer does not have any disability forms from us yet-are they done? What is the status? Please call pt and let him know.       I gave pt number to forms completion to call to find out status [phone]

## 2023-04-07 DIAGNOSIS — E11.9 TYPE 2 DIABETES MELLITUS WITHOUT COMPLICATION, WITHOUT LONG-TERM CURRENT USE OF INSULIN (HCC): ICD-10-CM

## 2023-04-07 DIAGNOSIS — M54.16 LUMBAR RADICULITIS: ICD-10-CM

## 2023-04-07 RX ORDER — BLOOD SUGAR DIAGNOSTIC
STRIP MISCELLANEOUS
Qty: 100 STRIP | Refills: 0 | Status: SHIPPED | OUTPATIENT
Start: 2023-04-07

## 2023-04-07 RX ORDER — CYCLOBENZAPRINE HCL 10 MG
TABLET ORAL
Qty: 30 TABLET | Refills: 0 | Status: SHIPPED | OUTPATIENT
Start: 2023-04-07

## 2023-04-07 NOTE — TELEPHONE ENCOUNTER
Last visit- 08/12/2022 cpe seen by [facility]    Last refill- 03/21/2023 cyclobenzaprine 10mg QTY30 0R,  08/26/2022 glucose blood in vitro strip [id number] 0R    Last labs- 11/21/2022 hemoglobin a1c    No future appointments. Per Protocol?   Please approve or deny

## 2023-04-10 NOTE — TELEPHONE ENCOUNTER
Dr. Vernell San     Please sign off on form if you agree to: Disab Start 03/27/23- RTW pending completion of PT/recovery. (Please place your signature on the first page only)    -Within your inbasket, Highlight the patient and hit \"Chart\" button. -In Chart Review, w/in the Encounter tab - click 1 time on the Telephone call encounter for 03/28/2023 Scroll down the telephone encounter.  -Click \"scan on\" blue Hyperlink under \"Media\" heading for Disab Dr Vernell San 04/10/23 w/in the telephone enc.  -Click on Acknowledge button at the bottom right corner and left-click onto image, signature stamp appears and drag signature to Provider signature line. Stamp will turn blue. Close window.      Thank you,    Ifeoma Saeed

## 2023-04-12 NOTE — TELEPHONE ENCOUNTER
Disab forms faxed to [facility] [phone] confirmation received, [employer] message sent.

## 2023-04-18 ENCOUNTER — APPOINTMENT (OUTPATIENT)
Dept: PHYSICAL THERAPY | Facility: HOSPITAL | Age: 68
End: 2023-04-18
Attending: FAMILY MEDICINE
Payer: COMMERCIAL

## 2023-04-25 ENCOUNTER — APPOINTMENT (OUTPATIENT)
Dept: PHYSICAL THERAPY | Facility: HOSPITAL | Age: 68
End: 2023-04-25
Attending: FAMILY MEDICINE
Payer: COMMERCIAL

## 2023-04-27 ENCOUNTER — APPOINTMENT (OUTPATIENT)
Dept: PHYSICAL THERAPY | Facility: HOSPITAL | Age: 68
End: 2023-04-27
Attending: FAMILY MEDICINE
Payer: COMMERCIAL

## 2023-05-10 ENCOUNTER — TELEPHONE (OUTPATIENT)
Dept: INTERNAL MEDICINE CLINIC | Facility: CLINIC | Age: 68
End: 2023-05-10

## 2023-05-15 ENCOUNTER — OFFICE VISIT (OUTPATIENT)
Dept: INTERNAL MEDICINE CLINIC | Facility: CLINIC | Age: 68
End: 2023-05-15
Payer: COMMERCIAL

## 2023-05-15 VITALS
SYSTOLIC BLOOD PRESSURE: 126 MMHG | TEMPERATURE: 98 F | HEIGHT: 67 IN | WEIGHT: 198 LBS | HEART RATE: 80 BPM | DIASTOLIC BLOOD PRESSURE: 62 MMHG | BODY MASS INDEX: 31.08 KG/M2

## 2023-05-15 DIAGNOSIS — M51.26 LUMBAR DISC HERNIATION: ICD-10-CM

## 2023-05-15 DIAGNOSIS — M54.16 LUMBAR RADICULITIS: ICD-10-CM

## 2023-05-15 DIAGNOSIS — Z12.5 SCREENING FOR PROSTATE CANCER: ICD-10-CM

## 2023-05-15 DIAGNOSIS — E11.9 TYPE 2 DIABETES MELLITUS WITHOUT COMPLICATION, WITHOUT LONG-TERM CURRENT USE OF INSULIN (HCC): Primary | ICD-10-CM

## 2023-05-15 DIAGNOSIS — Z13.0 SCREENING FOR DEFICIENCY ANEMIA: ICD-10-CM

## 2023-05-15 LAB
CARTRIDGE LOT#: 567 NUMERIC
HEMOGLOBIN A1C: 7 % (ref 4.3–5.6)

## 2023-05-31 DIAGNOSIS — M54.16 LUMBAR RADICULITIS: ICD-10-CM

## 2023-06-01 RX ORDER — CYCLOBENZAPRINE HCL 10 MG
TABLET ORAL
Qty: 30 TABLET | Refills: 0 | Status: SHIPPED | OUTPATIENT
Start: 2023-06-01

## 2023-06-27 DIAGNOSIS — E11.9 TYPE 2 DIABETES MELLITUS WITHOUT COMPLICATION, WITHOUT LONG-TERM CURRENT USE OF INSULIN (HCC): ICD-10-CM

## 2023-06-27 RX ORDER — METFORMIN HYDROCHLORIDE 500 MG/1
1000 TABLET, EXTENDED RELEASE ORAL
Qty: 180 TABLET | Refills: 0 | Status: SHIPPED | OUTPATIENT
Start: 2023-06-27

## 2023-07-27 ENCOUNTER — OFFICE VISIT (OUTPATIENT)
Dept: SURGERY | Facility: CLINIC | Age: 68
End: 2023-07-27

## 2023-07-27 DIAGNOSIS — R82.90 URINE FINDING: ICD-10-CM

## 2023-07-27 DIAGNOSIS — N48.6 PEYRONIE DISEASE: Primary | ICD-10-CM

## 2023-07-27 DIAGNOSIS — R97.20 ELEVATED PSA: ICD-10-CM

## 2023-07-27 LAB
APPEARANCE: CLEAR
BILIRUBIN: NEGATIVE
GLUCOSE (URINE DIPSTICK): NEGATIVE MG/DL
KETONES (URINE DIPSTICK): NEGATIVE MG/DL
LEUKOCYTES: NEGATIVE
MULTISTIX LOT#: NORMAL NUMERIC
NITRITE, URINE: NEGATIVE
OCCULT BLOOD: NEGATIVE
PH, URINE: 6.5 (ref 4.5–8)
PROTEIN (URINE DIPSTICK): NEGATIVE MG/DL
SPECIFIC GRAVITY: 1.02 (ref 1–1.03)
URINE-COLOR: YELLOW
UROBILINOGEN,SEMI-QN: 0.2 MG/DL (ref 0–1.9)

## 2023-07-27 PROCEDURE — 81003 URINALYSIS AUTO W/O SCOPE: CPT | Performed by: SURGERY

## 2023-07-27 PROCEDURE — 99214 OFFICE O/P EST MOD 30 MIN: CPT | Performed by: SURGERY

## 2023-07-27 NOTE — PROGRESS NOTES
Urology Clinic Note    Referring Provider:  No referring provider defined for this encounter. Primary Care Provider:  Mariam Sethi MD     Chief Complaint:   Elevated PSA    HPI:   Sylvia Ojeda is a 74year old male with history of diabetes referred for elevated PSA. He was seen on 10/18/2022 by Dr. Filiberto Nicolas. He previously had a slightly elevated PSA on 7/14/2021 up to 4.10. On recheck 6/20/2022 was down to 3.21. He also endorsed dorsal penile curvature and had a 2.5 cm palpable penile plaque on examination. Today he continues to endorse bothersome dorsal penile curvature. He measured it at home and noted a dorsal curvature of 68 degrees. He is interested in therapy for this.     PSA:  Lab Results   Component Value Date    PSA 3.21 06/20/2022    PSA 1.410 12/30/2014    PSA 1.70 08/16/2011    PSAS 4.10 (H) 07/14/2021    PSAS 2.15 12/09/2019    PSAS 1.86 12/08/2018    PSAS 1.99 08/24/2017      History:     Past Medical History:   Diagnosis Date    Diabetes ([facility] [address]     Visual impairment     reading glasses       Past Surgical History:   Procedure Laterality Date    OTHER      ear surgery x 3    OTHER SURGICAL HISTORY  2010    Right arm fracture with surgery       Family History   Problem Relation Age of Onset    Stroke Mother     Heart Disease Mother     Cancer Neg        Social History     Socioeconomic History    Marital status:    Tobacco Use    Smoking status: Never    Smokeless tobacco: Never   Vaping Use    Vaping Use: Never used   Substance and Sexual Activity    Alcohol use: Yes     Comment: social/weekend once per month    Drug use: No    Sexual activity: Yes       Medications (Active prior to today's visit):  Current Outpatient Medications   Medication Sig Dispense Refill    metFORMIN  MG Oral Tablet 24 Hr Take 2 tablets (1,000 mg total) by mouth daily with breakfast. 180 tablet 0    CYCLOBENZAPRINE 10 MG Oral Tab TAKE 1 TABLET BY MOUTH EVERY DAY AT NIGHT AS NEEDED FOR MUSCLE SPASM 30 tablet 0    Glucose Blood (ONETOUCH VERIO) In Vitro Strip USE TO CHECK BLOOD SUGARS ONCE DAILY 100 strip 0    ROSUVASTATIN 10 MG Oral Tab TAKE 1 TABLET BY MOUTH EVERY DAY AT NIGHT 90 tablet 1    methylPREDNISolone (MEDROL) 4 MG Oral Tablet Therapy Pack As directed. 1 each 0    Blood Glucose Monitoring Suppl ([address]) w/Device Does not apply Kit Use to test blood sugars once daily 1 kit 0    Glucose Blood (ONETOUCH VERIO) In Vitro Strip Use to test blood sugars once daily. 100 strip 1    ONETOUCH VERIO In Vitro Strip TEST DAILY 100 strip 0    Glucose Blood (ACCU-CHEK GUIDE) In Vitro Strip 1 strip by Finger stick route daily. 100 strip 1    Fexofenadine HCl 180 MG Oral Tab Take 1 tablet (180 mg total) by mouth daily. Omega-3-acid Ethyl Esters 1 g Oral Cap Take 1 capsule (1 g total) by mouth daily. Cinnamon 500 MG Oral Cap Take 500 mg by mouth daily. Garlic 507 MG Oral Cap Take 1 capsule by mouth daily. Coenzyme Q10 (COQ-10) 100 MG Oral Cap Take 1 capsule by mouth daily. Multiple Vitamin Oral Tab Take 1 tablet by mouth daily. Ascorbic Acid (VITAMIN C) 1000 MG Oral Tab Take 1 tablet (1,000 mg total) by mouth daily. Cholecalciferol (VITAMIN D3) 25 MCG (1000 UT) Oral Cap Take 1 tablet by mouth daily. Allergies:    Pollen                  OTHER (SEE COMMENTS)    Comment:sneezing      Review of Systems:   A comprehensive 10-point review of systems was completed. Pertinent positives and negatives are noted in the the HPI.     Physical Exam:   CONSTITUTIONAL: Well developed, well nourished, in no acute distress  NEUROLOGIC: Alert and oriented  HEAD: Normocephalic, atraumatic  EYES: Sclera non-icteric  ENT: Hearing intact, moist mucous membranes  NECK: No obvious goiter or masses  RESPIRATORY: Normal respiratory effort  SKIN: No evident rashes  ABDOMEN: Soft, non-tender, non-distended  GENITOURINARY: Palpable dorsal penile plaque    Assessment & Plan:   Radha Kaur Maren Haynes is a 74year old male with history of diabetes referred for elevated PSA. He was seen on 10/18/2022 by Dr. Lena Ingram. He previously had a slightly elevated PSA on 7/14/2021 up to 4.10. On recheck 6/20/2022 was down to 3.21. He also endorsed dorsal penile curvature and had a 2.5 cm palpable penile plaque on examination. Today he continues to endorse bothersome dorsal penile curvature. He measured it at home and noted a dorsal curvature of 68 degrees. He is interested in therapy for this. I discussed the nature of Peyronie's disease and Xiaflex injections. I told him that I do not perform these injections but I gave him some information of some local urologists who should be able to take care of him for Peyronie's disease. I also discussed repeating a PSA for his prior mild elevation.    -Repeat PSA  -Referral to sexual medicine urologist specializing in Peyronie's disease (as below) for Xiaflex injections and further evaluation    Dr. Cherry Brought  [facility] # [phone], [facility], [address] [facility]  [phone]    Dr. Chantelle Katz  [facility] [address] [address], [address] [place] [address]  [phone]    Dr. Higginbotham Listen  [address] [place] [address], [place], [address] [facility]  [phone]        Thank you for this consult. I have personally reviewed all relevant medical records, labs, and imaging. In total, 30 minutes were spent on this patient encounter (including chart review, patient history, physical, and counseling, documentation, and communication). Sabrina Deng.  Racquel Reyez MD  Staff Urologist  [facility]  Office: [phone]

## 2023-08-08 ENCOUNTER — LAB ENCOUNTER (OUTPATIENT)
Dept: LAB | Facility: HOSPITAL | Age: 68
End: 2023-08-08
Attending: SURGERY
Payer: COMMERCIAL

## 2023-08-08 DIAGNOSIS — R97.20 ELEVATED PSA: ICD-10-CM

## 2023-08-08 LAB — PSA SERPL-MCNC: 4.59 NG/ML (ref ?–4)

## 2023-08-08 PROCEDURE — 36415 COLL VENOUS BLD VENIPUNCTURE: CPT

## 2023-08-08 PROCEDURE — 84153 ASSAY OF PSA TOTAL: CPT

## 2023-08-11 DIAGNOSIS — E11.9 TYPE 2 DIABETES MELLITUS WITHOUT COMPLICATION, WITHOUT LONG-TERM CURRENT USE OF INSULIN (HCC): ICD-10-CM

## 2023-08-11 DIAGNOSIS — M54.16 LUMBAR RADICULITIS: ICD-10-CM

## 2023-08-11 RX ORDER — BLOOD SUGAR DIAGNOSTIC
STRIP MISCELLANEOUS
Qty: 100 STRIP | Refills: 0 | Status: SHIPPED | OUTPATIENT
Start: 2023-08-11

## 2023-08-11 RX ORDER — CYCLOBENZAPRINE HCL 10 MG
10 TABLET ORAL NIGHTLY PRN
Qty: 30 TABLET | Refills: 0 | Status: SHIPPED | OUTPATIENT
Start: 2023-08-11

## 2023-08-11 RX ORDER — METFORMIN HYDROCHLORIDE 500 MG/1
1000 TABLET, EXTENDED RELEASE ORAL
Qty: 180 TABLET | Refills: 0 | Status: SHIPPED | OUTPATIENT
Start: 2023-08-11

## 2023-08-11 NOTE — TELEPHONE ENCOUNTER
Requested Prescriptions     Pending Prescriptions Disp Refills    CYCLOBENZAPRINE 10 MG Oral Tab [Pharmacy Med Name: CYCLOBENZAPRINE 10 MG TABLET] 30 tablet 0     Sig: TAKE 1 TABLET BY MOUTH EVERY DAY AT NIGHT AS NEEDED FOR MUSCLE SPASM    Jason Graves In Vitro Strip [place] Med Name: ONE TOUCH VERIO TEST STRIP] 100 strip 0     Sig: USE TO CHECK BLOOD SUGARS ONCE DAILY    METFORMIN  MG Oral Tablet 24 Hr [Pharmacy Med Name: METFORMIN HCL  MG TABLET] 180 tablet 0     Sig: TAKE 2 TABLETS BY MOUTH EVERY DAY WITH BREAKFAST       LOV: 5/15/23    LAST PHYSICAL: 8/12/22    LAST REFILL: cyclobenzaprine-30-6/1/23  Onetouch verio-100 strips-4/7/23  Metformin-180-6/27/23    LAST LAB: 6/20/22    Your Appointments      Tuesday August 15, 2023  4:20 PM  Physical - Established with Contreras Morfin MD  [address], [facility]

## 2023-09-11 ENCOUNTER — OFFICE VISIT (OUTPATIENT)
Dept: INTERNAL MEDICINE CLINIC | Facility: CLINIC | Age: 68
End: 2023-09-11
Payer: COMMERCIAL

## 2023-09-11 VITALS
DIASTOLIC BLOOD PRESSURE: 64 MMHG | HEART RATE: 92 BPM | WEIGHT: 165 LBS | BODY MASS INDEX: 25.9 KG/M2 | TEMPERATURE: 97 F | SYSTOLIC BLOOD PRESSURE: 102 MMHG | HEIGHT: 67 IN

## 2023-09-11 DIAGNOSIS — Z86.010 PERSONAL HISTORY OF COLONIC POLYPS: ICD-10-CM

## 2023-09-11 DIAGNOSIS — Z00.00 WELLNESS EXAMINATION: Primary | ICD-10-CM

## 2023-09-11 DIAGNOSIS — R97.20 ELEVATED PSA: ICD-10-CM

## 2023-09-11 DIAGNOSIS — E11.9 TYPE 2 DIABETES MELLITUS WITHOUT COMPLICATION, WITHOUT LONG-TERM CURRENT USE OF INSULIN (HCC): ICD-10-CM

## 2023-09-11 DIAGNOSIS — N48.6 PEYRONIE DISEASE: ICD-10-CM

## 2023-09-11 PROCEDURE — 3074F SYST BP LT 130 MM HG: CPT | Performed by: FAMILY MEDICINE

## 2023-09-11 PROCEDURE — 3008F BODY MASS INDEX DOCD: CPT | Performed by: FAMILY MEDICINE

## 2023-09-11 PROCEDURE — 3078F DIAST BP <80 MM HG: CPT | Performed by: FAMILY MEDICINE

## 2023-09-11 PROCEDURE — 99397 PER PM REEVAL EST PAT 65+ YR: CPT | Performed by: FAMILY MEDICINE

## 2023-09-12 ENCOUNTER — LAB ENCOUNTER (OUTPATIENT)
Dept: LAB | Facility: HOSPITAL | Age: 68
End: 2023-09-12
Attending: FAMILY MEDICINE
Payer: COMMERCIAL

## 2023-09-12 DIAGNOSIS — E11.9 TYPE 2 DIABETES MELLITUS WITHOUT COMPLICATION, WITHOUT LONG-TERM CURRENT USE OF INSULIN (HCC): ICD-10-CM

## 2023-09-12 DIAGNOSIS — Z13.0 SCREENING FOR DEFICIENCY ANEMIA: ICD-10-CM

## 2023-09-12 LAB
ALBUMIN SERPL-MCNC: 3.5 G/DL (ref 3.4–5)
ALBUMIN/GLOB SERPL: 0.9 {RATIO} (ref 1–2)
ALP LIVER SERPL-CCNC: 66 U/L
ALT SERPL-CCNC: 35 U/L
ANION GAP SERPL CALC-SCNC: 4 MMOL/L (ref 0–18)
AST SERPL-CCNC: 15 U/L (ref 15–37)
BASOPHILS # BLD AUTO: 0.03 X10(3) UL (ref 0–0.2)
BASOPHILS NFR BLD AUTO: 0.4 %
BILIRUB SERPL-MCNC: 0.3 MG/DL (ref 0.1–2)
BUN BLD-MCNC: 19 MG/DL (ref 7–18)
CALCIUM BLD-MCNC: 8.9 MG/DL (ref 8.5–10.1)
CHLORIDE SERPL-SCNC: 104 MMOL/L (ref 98–112)
CHOLEST SERPL-MCNC: 113 MG/DL (ref ?–200)
CO2 SERPL-SCNC: 28 MMOL/L (ref 21–32)
CREAT BLD-MCNC: 1.02 MG/DL
CREAT UR-SCNC: 143 MG/DL
EGFRCR SERPLBLD CKD-EPI 2021: 81 ML/MIN/1.73M2 (ref 60–?)
EOSINOPHIL # BLD AUTO: 0.16 X10(3) UL (ref 0–0.7)
EOSINOPHIL NFR BLD AUTO: 2.1 %
ERYTHROCYTE [DISTWIDTH] IN BLOOD BY AUTOMATED COUNT: 12.6 %
EST. AVERAGE GLUCOSE BLD GHB EST-MCNC: 163 MG/DL (ref 68–126)
FASTING PATIENT LIPID ANSWER: NO
FASTING STATUS PATIENT QL REPORTED: NO
GLOBULIN PLAS-MCNC: 4 G/DL (ref 2.8–4.4)
GLUCOSE BLD-MCNC: 128 MG/DL (ref 70–99)
HBA1C MFR BLD: 7.3 % (ref ?–5.7)
HCT VFR BLD AUTO: 39.4 %
HDLC SERPL-MCNC: 43 MG/DL (ref 40–59)
HGB BLD-MCNC: 13.6 G/DL
IMM GRANULOCYTES # BLD AUTO: 0.04 X10(3) UL (ref 0–1)
IMM GRANULOCYTES NFR BLD: 0.5 %
LDLC SERPL CALC-MCNC: 55 MG/DL (ref ?–100)
LYMPHOCYTES # BLD AUTO: 2.01 X10(3) UL (ref 1–4)
LYMPHOCYTES NFR BLD AUTO: 27 %
MCH RBC QN AUTO: 30 PG (ref 26–34)
MCHC RBC AUTO-ENTMCNC: 34.5 G/DL (ref 31–37)
MCV RBC AUTO: 87 FL
MICROALBUMIN UR-MCNC: 0.8 MG/DL
MICROALBUMIN/CREAT 24H UR-RTO: 5.6 UG/MG (ref ?–30)
MONOCYTES # BLD AUTO: 0.71 X10(3) UL (ref 0.1–1)
MONOCYTES NFR BLD AUTO: 9.5 %
NEUTROPHILS # BLD AUTO: 4.5 X10 (3) UL (ref 1.5–7.7)
NEUTROPHILS # BLD AUTO: 4.5 X10(3) UL (ref 1.5–7.7)
NEUTROPHILS NFR BLD AUTO: 60.5 %
NONHDLC SERPL-MCNC: 70 MG/DL (ref ?–130)
OSMOLALITY SERPL CALC.SUM OF ELEC: 286 MOSM/KG (ref 275–295)
PLATELET # BLD AUTO: 233 10(3)UL (ref 150–450)
POTASSIUM SERPL-SCNC: 4.4 MMOL/L (ref 3.5–5.1)
PROT SERPL-MCNC: 7.5 G/DL (ref 6.4–8.2)
RBC # BLD AUTO: 4.53 X10(6)UL
SODIUM SERPL-SCNC: 136 MMOL/L (ref 136–145)
TRIGL SERPL-MCNC: 75 MG/DL (ref 30–149)
VLDLC SERPL CALC-MCNC: 11 MG/DL (ref 0–30)
WBC # BLD AUTO: 7.5 X10(3) UL (ref 4–11)

## 2023-09-12 PROCEDURE — 80061 LIPID PANEL: CPT

## 2023-09-12 PROCEDURE — 85025 COMPLETE CBC W/AUTO DIFF WBC: CPT

## 2023-09-12 PROCEDURE — 80053 COMPREHEN METABOLIC PANEL: CPT

## 2023-09-12 PROCEDURE — 82043 UR ALBUMIN QUANTITATIVE: CPT

## 2023-09-12 PROCEDURE — 83036 HEMOGLOBIN GLYCOSYLATED A1C: CPT

## 2023-09-12 PROCEDURE — 82570 ASSAY OF URINE CREATININE: CPT

## 2023-09-12 PROCEDURE — 36415 COLL VENOUS BLD VENIPUNCTURE: CPT

## 2023-10-12 DIAGNOSIS — M54.16 LUMBAR RADICULITIS: ICD-10-CM

## 2023-10-12 RX ORDER — CYCLOBENZAPRINE HCL 10 MG
10 TABLET ORAL NIGHTLY PRN
Qty: 30 TABLET | Refills: 0 | Status: SHIPPED | OUTPATIENT
Start: 2023-10-12

## 2023-10-12 NOTE — TELEPHONE ENCOUNTER
Requested Prescriptions     Pending Prescriptions Disp Refills    CYCLOBENZAPRINE 10 MG Oral Tab [Pharmacy Med Name: CYCLOBENZAPRINE 10 MG TABLET] 30 tablet 0     Sig: TAKE 1 TABLET BY MOUTH EVERY DAY NIGHTLY AS NEEDED FOR MUSCLE SPASMS       LOV: 9/11/23    LAST PHYSICAL: 9/11/23    LAST REFILL: cyclobenzaprine-30-8/11/23    LAST LAB: 9/12/23

## 2023-10-18 DIAGNOSIS — E11.9 TYPE 2 DIABETES MELLITUS WITHOUT COMPLICATION, WITHOUT LONG-TERM CURRENT USE OF INSULIN (HCC): ICD-10-CM

## 2023-10-19 RX ORDER — METFORMIN HYDROCHLORIDE 500 MG/1
1000 TABLET, EXTENDED RELEASE ORAL
Qty: 180 TABLET | Refills: 0 | Status: SHIPPED | OUTPATIENT
Start: 2023-10-19

## 2023-10-19 RX ORDER — BLOOD-GLUCOSE METER
EACH MISCELLANEOUS
Qty: 100 STRIP | Refills: 0 | Status: SHIPPED | OUTPATIENT
Start: 2023-10-19

## 2023-11-17 ENCOUNTER — TELEPHONE (OUTPATIENT)
Dept: INTERNAL MEDICINE CLINIC | Facility: CLINIC | Age: 68
End: 2023-11-17

## 2023-11-17 NOTE — TELEPHONE ENCOUNTER
Received a fax from [facility] for a pre op clearance for the patient on 11/17. I left 2 voice mails for the patient to call us back to schedule his pre op. Form is in the blue folder.

## 2023-12-01 DIAGNOSIS — E11.9 TYPE 2 DIABETES MELLITUS WITHOUT COMPLICATION, WITHOUT LONG-TERM CURRENT USE OF INSULIN (HCC): ICD-10-CM

## 2023-12-01 RX ORDER — ROSUVASTATIN CALCIUM 10 MG/1
10 TABLET, COATED ORAL NIGHTLY
Qty: 90 TABLET | Refills: 1 | Status: SHIPPED | OUTPATIENT
Start: 2023-12-01

## 2023-12-05 NOTE — TELEPHONE ENCOUNTER
Patient scheduled for pre op with [facility]. Clearance letter in red folder.     Future Appointments   Date Time Provider Martinez Pettit   1/4/2024  4:30 PM Florina Hdz MD [facility]

## 2023-12-14 DIAGNOSIS — E11.9 TYPE 2 DIABETES MELLITUS WITHOUT COMPLICATION, WITHOUT LONG-TERM CURRENT USE OF INSULIN (HCC): ICD-10-CM

## 2023-12-15 DIAGNOSIS — E11.9 TYPE 2 DIABETES MELLITUS WITHOUT COMPLICATION, WITHOUT LONG-TERM CURRENT USE OF INSULIN (HCC): ICD-10-CM

## 2023-12-15 DIAGNOSIS — M54.16 LUMBAR RADICULITIS: ICD-10-CM

## 2023-12-15 RX ORDER — BLOOD SUGAR DIAGNOSTIC
STRIP MISCELLANEOUS
Qty: 100 STRIP | Refills: 0 | Status: SHIPPED | OUTPATIENT
Start: 2023-12-15

## 2023-12-15 NOTE — TELEPHONE ENCOUNTER
Last VISIT:09/11/2023 MD EDGAR    Last CPE:09/11/2023    Last REFILL:10/19/2023   Medication Quantity Refills Start End   ONETOUCH VERIO In Vitro Strip 100 strip 0         Last LABS:09/12/2023    Future Appointments   Date Time Provider Martinez Pettit   1/4/2024  4:30 PM Shad Arcos MD [facility]         Per PROTOCOL? Passed Protocol

## 2023-12-17 RX ORDER — METFORMIN HYDROCHLORIDE 500 MG/1
1000 TABLET, EXTENDED RELEASE ORAL
Qty: 180 TABLET | Refills: 0 | Status: SHIPPED | OUTPATIENT
Start: 2023-12-17

## 2023-12-18 RX ORDER — CYCLOBENZAPRINE HCL 10 MG
10 TABLET ORAL NIGHTLY PRN
Qty: 30 TABLET | Refills: 0 | Status: SHIPPED | OUTPATIENT
Start: 2023-12-18

## 2024-01-04 ENCOUNTER — OFFICE VISIT (OUTPATIENT)
Dept: INTERNAL MEDICINE CLINIC | Facility: CLINIC | Age: 69
End: 2024-01-04
Payer: COMMERCIAL

## 2024-01-04 VITALS
WEIGHT: 173 LBS | OXYGEN SATURATION: 98 % | DIASTOLIC BLOOD PRESSURE: 50 MMHG | HEIGHT: 67 IN | HEART RATE: 82 BPM | BODY MASS INDEX: 27.15 KG/M2 | SYSTOLIC BLOOD PRESSURE: 120 MMHG

## 2024-01-04 DIAGNOSIS — N48.6 PEYRONIE DISEASE: ICD-10-CM

## 2024-01-04 DIAGNOSIS — E11.9 TYPE 2 DIABETES MELLITUS WITHOUT COMPLICATION, WITHOUT LONG-TERM CURRENT USE OF INSULIN (HCC): ICD-10-CM

## 2024-01-04 DIAGNOSIS — Z01.818 PREOPERATIVE EXAMINATION: Primary | ICD-10-CM

## 2024-01-04 LAB
ATRIAL RATE: 78 BPM
P AXIS: 65 DEGREES
P-R INTERVAL: 168 MS
Q-T INTERVAL: 376 MS
QRS DURATION: 96 MS
QTC CALCULATION (BEZET): 428 MS
R AXIS: 42 DEGREES
T AXIS: 72 DEGREES
VENTRICULAR RATE: 78 BPM

## 2024-01-04 PROCEDURE — 99214 OFFICE O/P EST MOD 30 MIN: CPT | Performed by: FAMILY MEDICINE

## 2024-01-04 PROCEDURE — 3074F SYST BP LT 130 MM HG: CPT | Performed by: FAMILY MEDICINE

## 2024-01-04 PROCEDURE — 93000 ELECTROCARDIOGRAM COMPLETE: CPT | Performed by: FAMILY MEDICINE

## 2024-01-04 PROCEDURE — 92229 IMG RTA DETC/MNTR DS POC ALY: CPT | Performed by: FAMILY MEDICINE

## 2024-01-04 PROCEDURE — 2033F EYE IMG VALID W/O RTNOPTHY: CPT | Performed by: FAMILY MEDICINE

## 2024-01-04 PROCEDURE — 3008F BODY MASS INDEX DOCD: CPT | Performed by: FAMILY MEDICINE

## 2024-01-04 PROCEDURE — 3078F DIAST BP <80 MM HG: CPT | Performed by: FAMILY MEDICINE

## 2024-01-04 RX ORDER — GABAPENTIN 300 MG/1
300 CAPSULE ORAL 3 TIMES DAILY
COMMUNITY
Start: 2023-08-11

## 2024-01-04 NOTE — PROGRESS NOTES
Shaheen [facility]  8/9/1955    Chief Complaint   Patient presents with    Pre-Op Exam     Room 9 ac  Pre op for penile plication       HPI:   Shhaeen Maharaj is a 68 year old male who presents for preoperative examination prior to his penile plication and correction of his curvature. He is overall doing well. No new chest pain or dyspnea. No new medication changes.     Current Outpatient Medications   Medication Sig Dispense Refill    gabapentin 300 MG Oral Cap Take 1 capsule (300 mg total) by mouth 3 (three) times daily.      cyclobenzaprine 10 MG Oral Tab Take 1 tablet (10 mg total) by mouth nightly as needed. 30 tablet 0    METFORMIN  MG Oral Tablet 24 Hr TAKE 2 TABLETS BY MOUTH EVERY DAY WITH BREAKFAST 180 tablet 0    ONETOUCH VERIO In Vitro Strip USE TO CHECK BLOOD SUGARS ONCE DAILY 100 strip 0    rosuvastatin 10 MG Oral Tab Take 1 tablet (10 mg total) by mouth nightly. 90 tablet 1    Blood Glucose Monitoring Suppl (ONETOUCH VERIO FLEX SYSTEM) w/Device Does not apply Kit Use to test blood sugars once daily 1 kit 0    ONETOUCH VERIO In Vitro Strip TEST DAILY 100 strip 0    Glucose Blood (ACCU-CHEK GUIDE) In Vitro Strip 1 strip by Finger stick route daily. 100 strip 1    Fexofenadine HCl 180 MG Oral Tab Take 1 tablet (180 mg total) by mouth daily.      Omega-3-acid Ethyl Esters 1 g Oral Cap Take 1 capsule (1 g total) by mouth daily.      Multiple Vitamin Oral Tab Take 1 tablet by mouth daily.      Ascorbic Acid (VITAMIN C) 1000 MG Oral Tab Take 1 tablet (1,000 mg total) by mouth daily.      Cholecalciferol (VITAMIN D3) 25 MCG (1000 UT) Oral Cap Take 1 tablet by mouth daily.      Cinnamon 500 MG Oral Cap Take 500 mg by mouth daily. (Patient not taking: Reported on 1/4/2024)      Garlic 500 MG Oral Cap Take 1 capsule by mouth daily. (Patient not taking: Reported on 1/4/2024)      Coenzyme Q10 (COQ-10) 100 MG Oral Cap Take 1 capsule by mouth daily. (Patient not taking: Reported on 1/4/2024)        Allergies   Allergen  Reactions    Pollen OTHER (SEE COMMENTS)     sneezing      Past Medical History:   Diagnosis Date    Allergic rhinitis 2006    Summer grass    Depression January 2022    Daughter passed away 2021    Diabetes (HCC)     Hyperlipidemia     Not sure    Visual impairment     reading glasses      Patient Active Problem List   Diagnosis    Left inguinal hernia    Type 2 diabetes mellitus without complication, without long-term current use of insulin (HCC)    Peyronie disease      Past Surgical History:   Procedure Laterality Date    COLONOSCOPY  2008 and 2019    Polyps removed both times    HERNIA SURGERY  8/2020    OTHER      ear surgery x 3    OTHER SURGICAL HISTORY  2010    Right arm fracture with surgery      Family History   Problem Relation Age of Onset    Stroke Mother     Heart Disease Mother     Cancer Neg       Social History     Socioeconomic History    Marital status:    Tobacco Use    Smoking status: Never    Smokeless tobacco: Never   Vaping Use    Vaping Use: Never used   Substance and Sexual Activity    Alcohol use: Yes     Alcohol/week: 2.0 - 22.0 standard drinks of alcohol     Types: 1 - 2 Glasses of wine, 1 - 20 Cans of beer per week     Comment: social/weekend once per month    Drug use: Never    Sexual activity: Yes   Other Topics Concern    Caffeine Concern No    Exercise No    Seat Belt No    Special Diet No    Stress Concern No    Weight Concern No         REVIEW OF SYSTEMS:   GENERAL: feels well otherwise  SKIN: no rash  EYES: no vision changes  HEENT: not congested  LUNGS: denies shortness of breath with exertion  CARDIOVASCULAR: denies chest pain on exertion  GI: no new abdominal pain     EXAM:   /50   Pulse 82   Ht 5' 7\" (1.702 m)   Wt 173 lb (78.5 kg)   SpO2 98%   BMI 27.10 kg/m²   GENERAL: Well developed, well nourished,in no apparent distress  SKIN: No rash  EYES: PERRLA, EOMI, conjunctiva are clear  HEENT: atraumatic, normocephalic,ears and throat are clear  NECK:  supple  LUNGS: clear to auscultation  CARDIO: RRR without murmur  GI: soft, NT    ASSESSMENT AND PLAN:   Shaheen Maharaj is a 68 year old male who presents for preoperative examination prior to his penile plication. RCRI score 0. EKG in office shows NSR without ischemic changes. He has no new anginal or dyspneic symptoms. He will complete his in the next week. We discussed perioperative medication management. He may proceed with his procedure at acceptable risk.     Preoperative examination  Peyronie disease  DM Type II  - ELECTROCARDIOGRAM, COMPLETE  - CMP Now; Future  - Lipids Now; Future  - Hemoglobin A1C Now; Future  - Microalb/Creat Ratio, Random Urine Now; Future  - Diabetic Retinopathy Exam  OU - Both Eyes    All questions were answered and the patient agrees with the plan.     Thank you,  Link Villegas MD

## 2024-01-05 ENCOUNTER — LAB ENCOUNTER (OUTPATIENT)
Dept: LAB | Facility: HOSPITAL | Age: 69
End: 2024-01-05
Attending: FAMILY MEDICINE
Payer: COMMERCIAL

## 2024-01-05 DIAGNOSIS — Z01.818 PREOPERATIVE EXAMINATION: ICD-10-CM

## 2024-01-05 DIAGNOSIS — E11.9 TYPE 2 DIABETES MELLITUS WITHOUT COMPLICATION, WITHOUT LONG-TERM CURRENT USE OF INSULIN (HCC): ICD-10-CM

## 2024-01-05 LAB
ALBUMIN SERPL-MCNC: 3.8 G/DL (ref 3.4–5)
ALBUMIN/GLOB SERPL: 1.2 {RATIO} (ref 1–2)
ALP LIVER SERPL-CCNC: 76 U/L
ALT SERPL-CCNC: 38 U/L
ANION GAP SERPL CALC-SCNC: 5 MMOL/L (ref 0–18)
AST SERPL-CCNC: 13 U/L (ref 15–37)
BILIRUB SERPL-MCNC: 0.5 MG/DL (ref 0.1–2)
BUN BLD-MCNC: 17 MG/DL (ref 9–23)
CALCIUM BLD-MCNC: 8.6 MG/DL (ref 8.5–10.1)
CHLORIDE SERPL-SCNC: 105 MMOL/L (ref 98–112)
CHOLEST SERPL-MCNC: 98 MG/DL (ref ?–200)
CO2 SERPL-SCNC: 29 MMOL/L (ref 21–32)
CREAT BLD-MCNC: 1.1 MG/DL
CREAT UR-SCNC: 150 MG/DL
EGFRCR SERPLBLD CKD-EPI 2021: 74 ML/MIN/1.73M2 (ref 60–?)
EST. AVERAGE GLUCOSE BLD GHB EST-MCNC: 160 MG/DL (ref 68–126)
FASTING PATIENT LIPID ANSWER: YES
FASTING STATUS PATIENT QL REPORTED: YES
GLOBULIN PLAS-MCNC: 3.3 G/DL (ref 2.8–4.4)
GLUCOSE BLD-MCNC: 186 MG/DL (ref 70–99)
HBA1C MFR BLD: 7.2 % (ref ?–5.7)
HDLC SERPL-MCNC: 34 MG/DL (ref 40–59)
LDLC SERPL CALC-MCNC: 50 MG/DL (ref ?–100)
MICROALBUMIN UR-MCNC: <0.5 MG/DL
NONHDLC SERPL-MCNC: 64 MG/DL (ref ?–130)
OSMOLALITY SERPL CALC.SUM OF ELEC: 294 MOSM/KG (ref 275–295)
POTASSIUM SERPL-SCNC: 4.3 MMOL/L (ref 3.5–5.1)
PROT SERPL-MCNC: 7.1 G/DL (ref 6.4–8.2)
SODIUM SERPL-SCNC: 139 MMOL/L (ref 136–145)
TRIGL SERPL-MCNC: 60 MG/DL (ref 30–149)
VLDLC SERPL CALC-MCNC: 9 MG/DL (ref 0–30)

## 2024-01-05 PROCEDURE — 80061 LIPID PANEL: CPT

## 2024-01-05 PROCEDURE — 36415 COLL VENOUS BLD VENIPUNCTURE: CPT

## 2024-01-05 PROCEDURE — 82570 ASSAY OF URINE CREATININE: CPT

## 2024-01-05 PROCEDURE — 80053 COMPREHEN METABOLIC PANEL: CPT

## 2024-01-05 PROCEDURE — 82043 UR ALBUMIN QUANTITATIVE: CPT

## 2024-01-05 PROCEDURE — 83036 HEMOGLOBIN GLYCOSYLATED A1C: CPT

## 2024-04-09 DIAGNOSIS — E11.9 TYPE 2 DIABETES MELLITUS WITHOUT COMPLICATION, WITHOUT LONG-TERM CURRENT USE OF INSULIN (HCC): ICD-10-CM

## 2024-04-10 RX ORDER — BLOOD SUGAR DIAGNOSTIC
STRIP MISCELLANEOUS
Qty: 100 STRIP | Refills: 0 | Status: SHIPPED | OUTPATIENT
Start: 2024-04-10

## 2024-04-10 RX ORDER — METFORMIN HYDROCHLORIDE 500 MG/1
1000 TABLET, EXTENDED RELEASE ORAL
Qty: 180 TABLET | Refills: 0 | Status: SHIPPED | OUTPATIENT
Start: 2024-04-10

## 2024-05-07 ENCOUNTER — OFFICE VISIT (OUTPATIENT)
Dept: INTERNAL MEDICINE CLINIC | Facility: CLINIC | Age: 69
End: 2024-05-07
Payer: COMMERCIAL

## 2024-05-07 VITALS
DIASTOLIC BLOOD PRESSURE: 60 MMHG | BODY MASS INDEX: 26 KG/M2 | TEMPERATURE: 97 F | WEIGHT: 164 LBS | HEART RATE: 82 BPM | SYSTOLIC BLOOD PRESSURE: 120 MMHG | OXYGEN SATURATION: 97 %

## 2024-05-07 DIAGNOSIS — B35.4 TINEA CORPORIS: ICD-10-CM

## 2024-05-07 DIAGNOSIS — Z86.010 PERSONAL HISTORY OF COLONIC POLYPS: ICD-10-CM

## 2024-05-07 DIAGNOSIS — E11.9 TYPE 2 DIABETES MELLITUS WITHOUT COMPLICATION, WITHOUT LONG-TERM CURRENT USE OF INSULIN (HCC): Primary | ICD-10-CM

## 2024-05-07 LAB — HEMOGLOBIN A1C: 6.6 % (ref 4.3–5.6)

## 2024-05-07 PROCEDURE — 3061F NEG MICROALBUMINURIA REV: CPT | Performed by: FAMILY MEDICINE

## 2024-05-07 PROCEDURE — 3074F SYST BP LT 130 MM HG: CPT | Performed by: FAMILY MEDICINE

## 2024-05-07 PROCEDURE — 3078F DIAST BP <80 MM HG: CPT | Performed by: FAMILY MEDICINE

## 2024-05-07 PROCEDURE — 3051F HG A1C>EQUAL 7.0%<8.0%: CPT | Performed by: FAMILY MEDICINE

## 2024-05-07 PROCEDURE — 83036 HEMOGLOBIN GLYCOSYLATED A1C: CPT | Performed by: FAMILY MEDICINE

## 2024-05-07 PROCEDURE — 3044F HG A1C LEVEL LT 7.0%: CPT | Performed by: FAMILY MEDICINE

## 2024-05-07 PROCEDURE — 99214 OFFICE O/P EST MOD 30 MIN: CPT | Performed by: FAMILY MEDICINE

## 2024-05-07 RX ORDER — CLOTRIMAZOLE AND BETAMETHASONE DIPROPIONATE 10; .64 MG/G; MG/G
1 CREAM TOPICAL 2 TIMES DAILY
Qty: 60 G | Refills: 3 | Status: SHIPPED | OUTPATIENT
Start: 2024-05-07 | End: 2024-05-21

## 2024-05-07 NOTE — PROGRESS NOTES
Shaheen Maharaj  8/9/1955    Chief Complaint   Patient presents with    Follow - Up     4 month f/u        HPI:   Shaheen Maharaj is a 68 year old male who presents for follow-up. His blood sugars have been controlled in the 120-130 fasting. As been taking his metformin 2 tablets in the AM and his Crestor at night. Completed his colonoscopy last week.     Current Outpatient Medications   Medication Sig Dispense Refill    ONETOUCH VERIO In Vitro Strip USE TO CHECK BLOOD SUGARS ONCE DAILY 100 strip 0    METFORMIN  MG Oral Tablet 24 Hr TAKE 2 TABLETS BY MOUTH EVERY DAY WITH BREAKFAST 180 tablet 0    gabapentin 300 MG Oral Cap Take 1 capsule (300 mg total) by mouth 3 (three) times daily.      cyclobenzaprine 10 MG Oral Tab Take 1 tablet (10 mg total) by mouth nightly as needed. 30 tablet 0    rosuvastatin 10 MG Oral Tab Take 1 tablet (10 mg total) by mouth nightly. 90 tablet 1    Blood Glucose Monitoring Suppl (ONETOUCH VERIO FLEX SYSTEM) w/Device Does not apply Kit Use to test blood sugars once daily 1 kit 0    ONETOUCH VERIO In Vitro Strip TEST DAILY 100 strip 0    Glucose Blood (ACCU-CHEK GUIDE) In Vitro Strip 1 strip by Finger stick route daily. 100 strip 1    Fexofenadine HCl 180 MG Oral Tab Take 1 tablet (180 mg total) by mouth daily.      Omega-3-acid Ethyl Esters 1 g Oral Cap Take 1 capsule (1 g total) by mouth daily.      Multiple Vitamin Oral Tab Take 1 tablet by mouth daily.      Ascorbic Acid (VITAMIN C) 1000 MG Oral Tab Take 1 tablet (1,000 mg total) by mouth daily.      Cholecalciferol (VITAMIN D3) 25 MCG (1000 UT) Oral Cap Take 1 tablet by mouth daily.      Cinnamon 500 MG Oral Cap Take 500 mg by mouth daily. (Patient not taking: Reported on 1/4/2024)      Garlic 500 MG Oral Cap Take 1 capsule by mouth daily. (Patient not taking: Reported on 1/4/2024)      Coenzyme Q10 (COQ-10) 100 MG Oral Cap Take 1 capsule by mouth daily. (Patient not taking: Reported on 1/4/2024)        Allergies   Allergen Reactions     Pollen OTHER (SEE COMMENTS)     sneezing      Past Medical History:    Allergic rhinitis    Summer grass    Depression    Daughter passed away 2021    Diabetes (HCC)    Hyperlipidemia    Not sure    Visual impairment    reading glasses      Patient Active Problem List   Diagnosis    Left inguinal hernia    Type 2 diabetes mellitus without complication, without long-term current use of insulin (HCC)    Peyronie disease      Past Surgical History:   Procedure Laterality Date    Colonoscopy  2008 and 2019    Polyps removed both times    Hernia surgery  8/2020    Other      ear surgery x 3    Other surgical history  2010    Right arm fracture with surgery      Family History   Problem Relation Age of Onset    Stroke Mother     Heart Disease Mother     Cancer Neg       Social History     Socioeconomic History    Marital status:    Tobacco Use    Smoking status: Never    Smokeless tobacco: Never   Vaping Use    Vaping status: Never Used   Substance and Sexual Activity    Alcohol use: Yes     Alcohol/week: 2.0 - 22.0 standard drinks of alcohol     Types: 1 - 2 Glasses of wine, 1 - 20 Cans of beer per week     Comment: social/weekend once per month    Drug use: Never    Sexual activity: Yes   Other Topics Concern    Caffeine Concern No    Exercise No    Seat Belt No    Special Diet No    Stress Concern No    Weight Concern No         REVIEW OF SYSTEMS:   GENERAL: feels well otherwise, no recent illness.     EXAM:   /60 (BP Location: Right arm, Patient Position: Sitting, Cuff Size: adult)   Pulse 82   Temp 97 °F (36.1 °C) (Temporal)   Wt 164 lb (74.4 kg)   SpO2 97%   BMI 25.69 kg/m²   GENERAL: Well developed, well nourished,in no apparent distress  SKIN: small erythematous macular rash on the right arm with central clearing.   EYES: PERRLA, EOMI, conjunctiva are clear  HEENT: atraumatic, normocephalic  LUNGS: clear to auscultation  CARDIO: RRR    ASSESSMENT AND PLAN:   Shaheen Maharaj is a 68 year old male  who presents for follow-up    Type 2 diabetes mellitus without complication, without long-term current use of insulin ([facility])  A1c today in office shows good control at 6.6.  Will continue his current medication regimen and dietary optimization.  He will follow-up in September for CPE with labs prior.  - POC Hgb A1C    Tinea corporis  Begin course of Lotrisone  - clotrimazole-betamethasone 1-0.05 % External Cream; Apply 1 Application topically 2 (two) times daily for 14 days.  Dispense: 60 g; Refill: 3    Personal history of colonic polyps  Next colonoscopy recommend in 7 years.     All questions were answered and the patient agrees with the plan.     Thank you,  Link Villegas MD

## 2024-06-19 DIAGNOSIS — E11.9 TYPE 2 DIABETES MELLITUS WITHOUT COMPLICATION, WITHOUT LONG-TERM CURRENT USE OF INSULIN (HCC): ICD-10-CM

## 2024-06-21 RX ORDER — BLOOD SUGAR DIAGNOSTIC
STRIP MISCELLANEOUS
Qty: 100 STRIP | Refills: 1 | Status: SHIPPED | OUTPATIENT
Start: 2024-06-21 | End: 2024-09-16

## 2024-06-21 NOTE — TELEPHONE ENCOUNTER
Refill passed per [facility] protocol.     Requested Prescriptions   Pending Prescriptions Disp Refills    ONETOUCH VERIO In Vitro Strip [Pharmacy Med Name: ONE TOUCH VERIO TEST STRIP] 100 strip 0     Sig: USE TO CHECK BLOOD SUGARS ONCE DAILY       Diabetic Supplies Protocol Passed - 6/19/2024  5:37 PM        Passed - In person appointment or virtual visit in the past 12 mos or appointment in next 3 mos     Recent Outpatient Visits              1 month ago Type 2 diabetes mellitus without complication, without long-term current use of insulin ([facility])    [facility] [address] [place], [place] [address]Tejas Michael, MD    Office Visit    5 months ago Preoperative examination    [facility] [address] [place], [place] [address]Tejas Michael, MD    Office Visit    9 months ago Wellness examination    [facility] [address] [place], [place] [address]Tejas Michael, MD    Office Visit    11 months ago Peyronie disease    [facility]    Office Visit    1 year ago Type 2 diabetes mellitus without complication, without long-term current use of insulin ([facility])    [facility] [address] [place], [place] [address]Tejas Michael, MD    Office Visit          Future Appointments         Provider Department Appt Notes    In 2 months Link Villegas MD [address] last cpe 9/11/2023, ok ev                          Recent Outpatient Visits              1 month ago Type 2 diabetes mellitus without complication, without long-term current use of insulin ([facility])    [facility] [address] [place], [place] [address]Tejas Michael, MD    Office Visit    5 months ago Preoperative examination    [facility] [address] [place], [place] [address]Tejas Michael, MD    Office Visit    9 months ago Wellness examination    [facility] [address] [place], [place] [address]Tejas Michael, MD    Office  Visit    11 months ago Peyronie disease    [facility], [facility] Bertrand Alejandro MD    Office Visit    1 year ago Type 2 diabetes mellitus without complication, without long-term current use of insulin ([facility])    [facility], [address] [place] [address] Link Villegas MD    Office Visit             Future Appointments         Provider Department Appt Notes    In 2 months Link Villegas MD [facility], [address] [place] [address] last cpe 9/11/2023, ok ev

## 2024-07-31 DIAGNOSIS — E11.9 TYPE 2 DIABETES MELLITUS WITHOUT COMPLICATION, WITHOUT LONG-TERM CURRENT USE OF INSULIN (HCC): ICD-10-CM

## 2024-08-03 RX ORDER — ROSUVASTATIN CALCIUM 10 MG/1
10 TABLET, COATED ORAL NIGHTLY
Qty: 90 TABLET | Refills: 1 | Status: SHIPPED | OUTPATIENT
Start: 2024-08-03

## 2024-08-03 NOTE — TELEPHONE ENCOUNTER
Refill passed per [facility] protocol.     Requested Prescriptions   Pending Prescriptions Disp Refills    ROSUVASTATIN 10 MG Oral Tab [Pharmacy Med Name: ROSUVASTATIN CALCIUM 10 MG TAB] 90 tablet 1     Sig: TAKE 1 TABLET BY MOUTH EVERY DAY AT NIGHT       Cholesterol Medication Protocol Passed - 7/31/2024 10:29 AM        Passed - ALT < 80     Lab Results   Component Value Date    ALT 38 01/05/2024             Passed - ALT resulted within past year        Passed - Lipid panel within past 12 months     Lab Results   Component Value Date    CHOLEST 98 01/05/2024    TRIG 60 01/05/2024    HDL 34 (L) 01/05/2024    LDL 50 01/05/2024    VLDL 9 01/05/2024    TCHDLRATIO 3.54 04/07/2018    NONHDLC 64 01/05/2024             Passed - In person appointment or virtual visit in the past 12 mos or appointment in next 3 mos     Recent Outpatient Visits              2 months ago Type 2 diabetes mellitus without complication, without long-term current use of insulin ([facility])    [address] Link Villegas MD    Office Visit    7 months ago [address]operative examination    [address]    Office Visit    10 months ago Wellness examination    [address] Link Villegas MD    Office Visit    1 year ago Peyronie disease    [facility] Bertrand Alejandro MD    Office Visit    1 year ago Type 2 diabetes mellitus without complication, without long-term current use of insulin ([facility])    [address] Link Villegas MD    Office Visit          Future Appointments         Provider Department Appt Notes    In 1 month Link Villegas MD [address] last cpe 9/11/2023, ok ev

## 2024-08-03 NOTE — TELEPHONE ENCOUNTER
Refill passed per [facility] protocol.     Requested Prescriptions   Pending Prescriptions Disp Refills    ROSUVASTATIN 10 MG Oral Tab [Pharmacy Med Name: ROSUVASTATIN CALCIUM 10 MG TAB] 90 tablet 1     Sig: TAKE 1 TABLET BY MOUTH EVERY DAY AT NIGHT       Cholesterol Medication Protocol Passed - 7/31/2024 10:29 AM        Passed - ALT < 80     Lab Results   Component Value Date    ALT 38 01/05/2024             Passed - ALT resulted within past year        Passed - Lipid panel within past 12 months     Lab Results   Component Value Date    CHOLEST 98 01/05/2024    TRIG 60 01/05/2024    HDL 34 (L) 01/05/2024    LDL 50 01/05/2024    VLDL 9 01/05/2024    TCHDLRATIO 3.54 04/07/2018    NONHDLC 64 01/05/2024             Passed - In person appointment or virtual visit in the past 12 mos or appointment in next 3 mos     Recent Outpatient Visits              2 months ago Type 2 diabetes mellitus without complication, without long-term current use of insulin ([facility])    [address] Link Villegas MD    Office Visit    7 months ago Preoperative examination    [address]    Office Visit    10 months ago Wellness examination    [address] Link Villegas MD    Office Visit    1 year ago Peyronie disease    [facility] Bertrand Alejandro MD    Office Visit    1 year ago Type 2 diabetes mellitus without complication, without long-term current use of insulin ([facility])    [address] Link Villegas MD    Office Visit          Future Appointments         Provider Department Appt Notes    In 1 month Link Villegas MD [address] last cpe 9/11/2023, ok ev

## 2024-08-22 DIAGNOSIS — E11.9 TYPE 2 DIABETES MELLITUS WITHOUT COMPLICATION, WITHOUT LONG-TERM CURRENT USE OF INSULIN (HCC): ICD-10-CM

## 2024-08-23 RX ORDER — METFORMIN HCL 500 MG
1000 TABLET, EXTENDED RELEASE 24 HR ORAL
Qty: 180 TABLET | Refills: 3 | Status: SHIPPED | OUTPATIENT
Start: 2024-08-23

## 2024-08-23 NOTE — TELEPHONE ENCOUNTER
Refill passed per [facility] protocols.    Requested Prescriptions   Pending Prescriptions Disp Refills    METFORMIN  MG Oral Tablet 24 Hr [Pharmacy Med Name: METFORMIN HCL  MG TABLET] 180 tablet 3     Sig: TAKE 2 TABLETS BY MOUTH EVERY DAY WITH BREAKFAST       Diabetes Medication Protocol Passed - 8/22/2024  8:25 AM        Passed - Last A1C < 7.5 and within past 6 months     Lab Results   Component Value Date    A1C 6.6 (A) 05/07/2024             Passed - In person appointment or virtual visit in the past 6 mos or appointment in next 3 mos     Recent Outpatient Visits              3 months ago Type 2 diabetes mellitus without complication, without long-term current use of insulin ([facility])    [address] Link Tovar MD    Office Visit    7 months ago Preoperative examination    [address]    Office Visit    11 months ago Wellness examination    [address]    Office Visit    1 year ago Peyronie disease    [facility] Bertrand Alejandro MD    Office Visit    1 year ago Type 2 diabetes mellitus without complication, without long-term current use of insulin ([facility])    [address] Link Tovar MD    Office Visit          Future Appointments         Provider Department Appt Notes    In 3 weeks Link Villegas MD [address] last cpe 9/11/2023, ok ev                    Passed - Microalbumin procedure in past 12 months or taking ACE/ARB        Passed - EGFRCR or GFRNAA > 50     GFR Evaluation  EGFRCR: 74 , resulted on 1/5/2024          Passed - GFR in the past 12 months

## 2024-08-29 ENCOUNTER — TELEPHONE (OUTPATIENT)
Dept: INTERNAL MEDICINE CLINIC | Facility: CLINIC | Age: 69
End: 2024-08-29

## 2024-08-29 DIAGNOSIS — Z13.0 SCREENING FOR ENDOCRINE, METABOLIC AND IMMUNITY DISORDER: ICD-10-CM

## 2024-08-29 DIAGNOSIS — Z13.29 SCREENING FOR ENDOCRINE, METABOLIC AND IMMUNITY DISORDER: ICD-10-CM

## 2024-08-29 DIAGNOSIS — Z13.228 SCREENING FOR ENDOCRINE, METABOLIC AND IMMUNITY DISORDER: ICD-10-CM

## 2024-08-29 DIAGNOSIS — Z12.5 SPECIAL SCREENING FOR MALIGNANT NEOPLASM OF PROSTATE: ICD-10-CM

## 2024-08-29 DIAGNOSIS — Z13.0 SCREENING FOR BLOOD DISEASE: ICD-10-CM

## 2024-08-29 DIAGNOSIS — Z13.220 SCREENING FOR LIPOID DISORDERS: ICD-10-CM

## 2024-08-29 DIAGNOSIS — Z13.29 SCREENING FOR THYROID DISORDER: ICD-10-CM

## 2024-08-29 DIAGNOSIS — Z00.00 ROUTINE GENERAL MEDICAL EXAMINATION AT A HEALTH CARE FACILITY: Primary | ICD-10-CM

## 2024-08-29 NOTE — TELEPHONE ENCOUNTER
Future Appointments   Date Time Provider Department Center   9/16/2024  4:30 PM Link Villegas MD [facility]     Labs placed per protocol.

## 2024-09-16 ENCOUNTER — OFFICE VISIT (OUTPATIENT)
Dept: INTERNAL MEDICINE CLINIC | Facility: CLINIC | Age: 69
End: 2024-09-16
Payer: COMMERCIAL

## 2024-09-16 VITALS
HEART RATE: 105 BPM | WEIGHT: 166 LBS | OXYGEN SATURATION: 97 % | SYSTOLIC BLOOD PRESSURE: 116 MMHG | BODY MASS INDEX: 26.06 KG/M2 | DIASTOLIC BLOOD PRESSURE: 48 MMHG | HEIGHT: 67 IN | RESPIRATION RATE: 18 BRPM

## 2024-09-16 DIAGNOSIS — N52.9 ERECTILE DYSFUNCTION, UNSPECIFIED ERECTILE DYSFUNCTION TYPE: ICD-10-CM

## 2024-09-16 DIAGNOSIS — Z00.00 WELLNESS EXAMINATION: Primary | ICD-10-CM

## 2024-09-16 DIAGNOSIS — E11.9 TYPE 2 DIABETES MELLITUS WITHOUT COMPLICATION, WITHOUT LONG-TERM CURRENT USE OF INSULIN (HCC): ICD-10-CM

## 2024-09-16 DIAGNOSIS — N48.6 PEYRONIE DISEASE: ICD-10-CM

## 2024-09-16 RX ORDER — BLOOD SUGAR DIAGNOSTIC
STRIP MISCELLANEOUS
Qty: 100 STRIP | Refills: 1 | Status: SHIPPED | OUTPATIENT
Start: 2024-09-16

## 2024-09-16 RX ORDER — TADALAFIL 10 MG/1
10 TABLET ORAL DAILY PRN
COMMUNITY
End: 2024-09-16 | Stop reason: ALTCHOICE

## 2024-09-16 RX ORDER — SILDENAFIL 50 MG/1
50 TABLET, FILM COATED ORAL
Qty: 10 TABLET | Refills: 0 | Status: SHIPPED | OUTPATIENT
Start: 2024-09-16

## 2024-09-16 NOTE — PROGRESS NOTES
Shaheen Maharaj  8/9/1955    Chief Complaint   Patient presents with    Physical     Pt presents for an annual physical.   Pt has active bloodwork not done 8/29/24  Pt is due for diabetic foot exam.       HPI:   Shaheen Maharaj is a 69 year old male who presents for CPE. He has not completed his labs. Has been consistent with his current treatment. He is staying active with exercise and trying to eat well.     Current Outpatient Medications   Medication Sig Dispense Refill    Tadalafil 10 MG Oral Tab Take 1 tablet (10 mg total) by mouth daily as needed for Erectile Dysfunction.      metFORMIN  MG Oral Tablet 24 Hr Take 2 tablets (1,000 mg total) by mouth daily with breakfast. 180 tablet 3    rosuvastatin 10 MG Oral Tab Take 1 tablet (10 mg total) by mouth nightly. 90 tablet 1    Glucose Blood (ONETOUCH VERIO) In Vitro Strip USE TO CHECK BLOOD SUGARS ONCE DAILY 100 strip 1    cyclobenzaprine 10 MG Oral Tab Take 1 tablet (10 mg total) by mouth nightly as needed. 30 tablet 0    Blood Glucose Monitoring Suppl (ONETOUCH VERIO FLEX SYSTEM) w/Device Does not apply Kit Use to test blood sugars once daily 1 kit 0    ONETOUCH VERIO In Vitro Strip TEST DAILY 100 strip 0    Fexofenadine HCl 180 MG Oral Tab Take 1 tablet (180 mg total) by mouth daily.      Omega-3-acid Ethyl Esters 1 g Oral Cap Take 1 capsule (1 g total) by mouth daily.      Multiple Vitamin Oral Tab Take 1 tablet by mouth daily.      Ascorbic Acid (VITAMIN C) 1000 MG Oral Tab Take 1 tablet (1,000 mg total) by mouth daily.      Cholecalciferol (VITAMIN D3) 25 MCG (1000 UT) Oral Cap Take 1 tablet by mouth daily.      Glucose Blood (ACCU-CHEK GUIDE) In Vitro Strip 1 strip by Finger stick route daily. (Patient not taking: Reported on 9/16/2024) 100 strip 1    Cinnamon 500 MG Oral Cap Take 500 mg by mouth daily. (Patient not taking: Reported on 1/4/2024)      Garlic 500 MG Oral Cap Take 1 capsule by mouth daily. (Patient not taking: Reported on 1/4/2024)       Coenzyme Q10 (COQ-10) 100 MG Oral Cap Take 1 capsule by mouth daily. (Patient not taking: Reported on 1/4/2024)        Allergies   Allergen Reactions    Pollen OTHER (SEE COMMENTS)     sneezing      Past Medical History:    Allergic rhinitis    Summer grass    Depression    Daughter passed away 2021    Diabetes (HCC)    Hyperlipidemia    Not sure    Visual impairment    reading glasses      Patient Active Problem List   Diagnosis    Left inguinal hernia    Type 2 diabetes mellitus without complication, without long-term current use of insulin (HCC)    Peyronie disease      Past Surgical History:   Procedure Laterality Date    Colonoscopy  2008 and 2019    Polyps removed both times    Hernia surgery  8/2020    Other      ear surgery x 3    Other surgical history  2010    Right arm fracture with surgery      Family History   Problem Relation Age of Onset    Stroke Mother     Heart Disease Mother     Cancer Neg       Social History     Socioeconomic History    Marital status:    Tobacco Use    Smoking status: Never    Smokeless tobacco: Never   Vaping Use    Vaping status: Never Used   Substance and Sexual Activity    Alcohol use: Yes     Alcohol/week: 2.0 - 22.0 standard drinks of alcohol     Types: 1 - 2 Glasses of wine, 1 - 20 Cans of beer per week     Comment: social/weekend once per month    Drug use: Never    Sexual activity: Yes   Other Topics Concern    Caffeine Concern No    Exercise No    Seat Belt No    Special Diet No    Stress Concern No    Weight Concern No         REVIEW OF SYSTEMS:   GENERAL: feels well otherwise  SKIN: no rashes  EYES: no new vision changes  HEENT: not congested  LUNGS: no new dyspnea  CARDIOVASCULAR: no new chest pain  GI: no new abdominal pain  NEURO: no headaches    EXAM:   /48   Pulse 105   Resp 18   Ht 5' 7\" (1.702 m)   Wt 166 lb (75.3 kg)   SpO2 97%   BMI 26.00 kg/m²   GENERAL: Well developed, well nourished,in no apparent distress  SKIN: No rashes,no  suspicious lesions  EYES: PERRLA, EOMI, conjunctiva are clear  HEENT: atraumatic, normocephalic,ears and throat are clear  NECK: supple,no adenopathy,no bruits  LUNGS: clear to auscultation  CARDIO: RRR without murmur  Bilateral barefoot skin diabetic exam is normal, visualized feet and the appearance is normal.  Bilateral monofilament/sensation of both feet is normal.  Pulsation pedal pulse exam of both lower legs/feet is normal as well.       ASSESSMENT AND PLAN:   Shaheen Maharaj is a 69 year old male who presents for CPE    Wellness examination  Discussed age appropriate health and wellness.     Type 2 diabetes mellitus without complication, without long-term current use of insulin (HCC)  Continue current treatment. Will completed labs in the next week. Continue statin. Eye exam UTD.   - Glucose Blood (ONETOUCH VERIO) In Vitro Strip; USE TO CHECK BLOOD SUGARS ONCE DAILY  Dispense: 100 strip; Refill: 1    Peyronie disease  S/P plication    Erectile dysfunction, unspecified erectile dysfunction type  Will try Sildenafil, dot have have improvement with Cialis  - Sildenafil Citrate 50 MG Oral Tab; Take 1 tablet (50 mg total) by mouth daily as needed for Erectile Dysfunction.  Dispense: 10 tablet; Refill: 0      All questions were answered and the patient agrees with the plan.     Thank you,  Link Villegas MD

## 2024-09-19 ENCOUNTER — LAB ENCOUNTER (OUTPATIENT)
Dept: LAB | Facility: HOSPITAL | Age: 69
End: 2024-09-19
Attending: FAMILY MEDICINE
Payer: COMMERCIAL

## 2024-09-19 DIAGNOSIS — Z13.0 SCREENING FOR ENDOCRINE, METABOLIC AND IMMUNITY DISORDER: ICD-10-CM

## 2024-09-19 DIAGNOSIS — Z13.228 SCREENING FOR ENDOCRINE, METABOLIC AND IMMUNITY DISORDER: ICD-10-CM

## 2024-09-19 DIAGNOSIS — Z00.00 ROUTINE GENERAL MEDICAL EXAMINATION AT A HEALTH CARE FACILITY: ICD-10-CM

## 2024-09-19 DIAGNOSIS — E11.9 TYPE 2 DIABETES MELLITUS WITHOUT COMPLICATION, WITHOUT LONG-TERM CURRENT USE OF INSULIN (HCC): Primary | ICD-10-CM

## 2024-09-19 DIAGNOSIS — Z13.29 SCREENING FOR ENDOCRINE, METABOLIC AND IMMUNITY DISORDER: ICD-10-CM

## 2024-09-19 DIAGNOSIS — Z13.220 SCREENING FOR LIPOID DISORDERS: ICD-10-CM

## 2024-09-19 DIAGNOSIS — E11.9 TYPE 2 DIABETES MELLITUS WITHOUT COMPLICATION, WITHOUT LONG-TERM CURRENT USE OF INSULIN (HCC): ICD-10-CM

## 2024-09-19 DIAGNOSIS — Z13.0 SCREENING FOR BLOOD DISEASE: ICD-10-CM

## 2024-09-19 DIAGNOSIS — Z12.5 SPECIAL SCREENING FOR MALIGNANT NEOPLASM OF PROSTATE: ICD-10-CM

## 2024-09-19 DIAGNOSIS — Z13.29 SCREENING FOR THYROID DISORDER: ICD-10-CM

## 2024-09-19 LAB
ALBUMIN SERPL-MCNC: 4.4 G/DL (ref 3.2–4.8)
ALBUMIN/GLOB SERPL: 1.6 {RATIO} (ref 1–2)
ALP LIVER SERPL-CCNC: 74 U/L
ALT SERPL-CCNC: 30 U/L
ANION GAP SERPL CALC-SCNC: 8 MMOL/L (ref 0–18)
AST SERPL-CCNC: 25 U/L (ref ?–34)
BASOPHILS # BLD AUTO: 0.05 X10(3) UL (ref 0–0.2)
BASOPHILS NFR BLD AUTO: 0.8 %
BILIRUB SERPL-MCNC: 0.7 MG/DL (ref 0.2–1.1)
BUN BLD-MCNC: 12 MG/DL (ref 9–23)
CALCIUM BLD-MCNC: 9.5 MG/DL (ref 8.7–10.4)
CHLORIDE SERPL-SCNC: 104 MMOL/L (ref 98–112)
CHOLEST SERPL-MCNC: 115 MG/DL (ref ?–200)
CO2 SERPL-SCNC: 27 MMOL/L (ref 21–32)
COMPLEXED PSA SERPL-MCNC: 4.11 NG/ML (ref ?–4)
CREAT BLD-MCNC: 1 MG/DL
EGFRCR SERPLBLD CKD-EPI 2021: 82 ML/MIN/1.73M2 (ref 60–?)
EOSINOPHIL # BLD AUTO: 0.19 X10(3) UL (ref 0–0.7)
EOSINOPHIL NFR BLD AUTO: 2.9 %
ERYTHROCYTE [DISTWIDTH] IN BLOOD BY AUTOMATED COUNT: 12.5 %
EST. AVERAGE GLUCOSE BLD GHB EST-MCNC: 154 MG/DL (ref 68–126)
FASTING PATIENT LIPID ANSWER: YES
FASTING STATUS PATIENT QL REPORTED: YES
GLOBULIN PLAS-MCNC: 2.7 G/DL (ref 2–3.5)
GLUCOSE BLD-MCNC: 132 MG/DL (ref 70–99)
HBA1C MFR BLD: 7 % (ref ?–5.7)
HCT VFR BLD AUTO: 41.4 %
HDLC SERPL-MCNC: 41 MG/DL (ref 40–59)
HGB BLD-MCNC: 14 G/DL
IMM GRANULOCYTES # BLD AUTO: 0.01 X10(3) UL (ref 0–1)
IMM GRANULOCYTES NFR BLD: 0.2 %
LDLC SERPL CALC-MCNC: 61 MG/DL (ref ?–100)
LYMPHOCYTES # BLD AUTO: 2.05 X10(3) UL (ref 1–4)
LYMPHOCYTES NFR BLD AUTO: 30.8 %
MCH RBC QN AUTO: 30 PG (ref 26–34)
MCHC RBC AUTO-ENTMCNC: 33.8 G/DL (ref 31–37)
MCV RBC AUTO: 88.7 FL
MONOCYTES # BLD AUTO: 0.62 X10(3) UL (ref 0.1–1)
MONOCYTES NFR BLD AUTO: 9.3 %
NEUTROPHILS # BLD AUTO: 3.73 X10 (3) UL (ref 1.5–7.7)
NEUTROPHILS # BLD AUTO: 3.73 X10(3) UL (ref 1.5–7.7)
NEUTROPHILS NFR BLD AUTO: 56 %
NONHDLC SERPL-MCNC: 74 MG/DL (ref ?–130)
OSMOLALITY SERPL CALC.SUM OF ELEC: 290 MOSM/KG (ref 275–295)
PLATELET # BLD AUTO: 231 10(3)UL (ref 150–450)
POTASSIUM SERPL-SCNC: 4.4 MMOL/L (ref 3.5–5.1)
PROT SERPL-MCNC: 7.1 G/DL (ref 5.7–8.2)
RBC # BLD AUTO: 4.67 X10(6)UL
SODIUM SERPL-SCNC: 139 MMOL/L (ref 136–145)
T3FREE SERPL-MCNC: 3.26 PG/ML (ref 2.4–4.2)
T4 FREE SERPL-MCNC: 1.2 NG/DL (ref 0.8–1.7)
TRIGL SERPL-MCNC: 60 MG/DL (ref 30–149)
TSI SER-ACNC: 0.44 MIU/ML (ref 0.55–4.78)
VLDLC SERPL CALC-MCNC: 9 MG/DL (ref 0–30)
WBC # BLD AUTO: 6.7 X10(3) UL (ref 4–11)

## 2024-09-19 PROCEDURE — 80061 LIPID PANEL: CPT

## 2024-09-19 PROCEDURE — 84443 ASSAY THYROID STIM HORMONE: CPT

## 2024-09-19 PROCEDURE — 80053 COMPREHEN METABOLIC PANEL: CPT

## 2024-09-19 PROCEDURE — 84481 FREE ASSAY (FT-3): CPT

## 2024-09-19 PROCEDURE — 36415 COLL VENOUS BLD VENIPUNCTURE: CPT

## 2024-09-19 PROCEDURE — 83036 HEMOGLOBIN GLYCOSYLATED A1C: CPT

## 2024-09-19 PROCEDURE — 85025 COMPLETE CBC W/AUTO DIFF WBC: CPT

## 2024-09-19 PROCEDURE — 84439 ASSAY OF FREE THYROXINE: CPT

## 2024-11-02 NOTE — H&P
New Patient Visit Note       Active Problems      1. Left inguinal hernia    2.  Type 2 diabetes mellitus without complication, without long-term current use of insulin [facility])        Chief Complaint   Patient presents with:  Hernia: Left groin hernia      Hi daily.  ACCU-CHEK FASTCLIX LANCETS Does not apply Misc, 1 lancet by Finger stick route daily. Use as directed.   metFORMIN HCl  MG Oral Tablet 24 Hr, Take 2 tablets (1,000 mg total) by mouth daily with breakfast. (Patient taking differently: Take 500 were evaluated in the standing position. There was a visible and palpable bulge in the left groin. This was reducible. The scrotum was invaginated into the left inguinal canal and a palpable bulge with positive impulse was present.   There was no bulge No

## 2024-11-06 DIAGNOSIS — N52.9 ERECTILE DYSFUNCTION, UNSPECIFIED ERECTILE DYSFUNCTION TYPE: ICD-10-CM

## 2024-11-11 RX ORDER — SILDENAFIL 50 MG/1
TABLET, FILM COATED ORAL
Qty: 6 TABLET | Refills: 1 | Status: SHIPPED | OUTPATIENT
Start: 2024-11-11

## 2024-11-11 NOTE — TELEPHONE ENCOUNTER
Refill Passed Per Protocol    Requested Prescriptions   Pending Prescriptions Disp Refills    SILDENAFIL CITRATE 50 MG Oral Tab [Pharmacy Med Name: SILDENAFIL 50 MG TABLET] 6 tablet 1     Sig: TAKE 1 TABLET BY MOUTH DAILY AS NEEDED FOR ERECTILE DYSFUNCTION *INSURANCE LIMITS TO 6 TABS/25 DAYS*       Genitourinary Medications Passed - 11/11/2024  3:30 PM        Passed - Patient does not have pulmonary hypertension on problem list        Passed - In person appointment or virtual visit in the past 12 mos or appointment in next 3 mos     Recent Outpatient Visits              1 month ago Wellness examination    [facility] [address] [place], [place] [address]Tejas Michael, MD    Office Visit    6 months ago Type 2 diabetes mellitus without complication, without long-term current use of insulin ([facility])    [facility] [address] [place], [place] [address]Tejas Michael, MD    Office Visit    10 months ago Preoperative examination    [facility] [address] [place], [place] [address]Tejas Michael, MD    Office Visit    1 year ago Wellness examination    [address]    Office Visit    1 year ago [facility]eyronie [facility]    [facility]          Future Appointments         Provider Department Appt Notes    In 4 months Link Villegas MD [address] 6mo f/u                         Future Appointments         Provider Department Appt Notes    In 4 months Link Villegas MD [address] 6mo f/u          Recent Outpatient Visits              1 month ago Wellness examination    [address]    Office Visit    6 months ago Type 2 diabetes mellitus without complication, without long-term current use of insulin ([facility])    [facility]  [facility], [address] [place] [address], Link Tovar MD    Office Visit    10 months ago Preoperative examination    [facility], [address] [place] [address], Link Tovar MD    Office Visit    1 year ago Wellness examination    [facility], [address] [place] [address], Link Tovar MD    Office Visit    1 year ago Peyronie disease    [facility], [facility] Bertrand Alejandro MD    Office Visit

## 2024-11-12 DIAGNOSIS — E11.9 TYPE 2 DIABETES MELLITUS WITHOUT COMPLICATION, WITHOUT LONG-TERM CURRENT USE OF INSULIN (HCC): ICD-10-CM

## 2024-11-14 RX ORDER — BLOOD SUGAR DIAGNOSTIC
STRIP MISCELLANEOUS
Qty: 100 STRIP | Refills: 1 | OUTPATIENT
Start: 2024-11-14

## 2024-11-14 NOTE — TELEPHONE ENCOUNTER
Refill prescription on file from 9/16/2024, has not been filled yet.    Outpatient Medication Detail     Disp Refills Start End    Glucose Blood (ONETOUCH VERIO) In Vitro Strip 100 strip 1 9/16/2024 --    Sig: USE TO CHECK BLOOD SUGARS ONCE DAILY    Sent to pharmacy as: OneTouch Verio In Vitro Strip    E-Prescribing Status: Receipt confirmed by pharmacy (9/16/2024  5:10 PM CDT)      Associated Diagnoses    Type 2 diabetes mellitus without complication, without long-term current use of insulin ([facility])        Pharmacy    [id number] - [place], [place] - [address] [phone], [phone]

## 2024-12-05 DIAGNOSIS — E11.9 TYPE 2 DIABETES MELLITUS WITHOUT COMPLICATION, WITHOUT LONG-TERM CURRENT USE OF INSULIN (HCC): ICD-10-CM

## 2024-12-10 RX ORDER — BLOOD SUGAR DIAGNOSTIC
1 STRIP MISCELLANEOUS DAILY
Qty: 100 STRIP | Refills: 3 | Status: SHIPPED | OUTPATIENT
Start: 2024-12-10

## 2024-12-10 NOTE — TELEPHONE ENCOUNTER
Refill passed per [facility] protocol.  Requested Prescriptions   Pending Prescriptions Disp Refills    ONETOUCH VERIO In Vitro Strip [Pharmacy Med Name: ONE TOUCH VERIO TEST STRIP] 100 strip 1     Sig: USE TO CHECK BLOOD SUGARS ONCE DAILY       Diabetic Supplies Protocol Passed - 12/10/2024 10:05 AM        Passed - In person appointment or virtual visit in the past 12 mos or appointment in next 3 mos     Recent Outpatient Visits              2 months ago Wellness examination    [address]    Office Visit    7 months ago Type 2 diabetes mellitus without complication, without long-term current use of insulin ([facility])    [address]    Office Visit    11 months ago Preoperative examination    [address]    Office Visit    1 year ago Wellness examination    [address]    Office Visit    1 year ago Peyronie disease    [facility] Bertrand Alejandro MD    Office Visit          Future Appointments         Provider Department Appt Notes    In 3 months Link Villegas MD [address] 6mo f/u                       Recent Outpatient Visits              2 months ago Wellness examination    [address]    Office Visit    7 months ago Type 2 diabetes mellitus without complication, without long-term current use of insulin ([facility])    [address]    Office Visit    11 months ago Preoperative examination    [address]    Office Visit    1 year ago Wellness examination    [address]  Link Tovar MD    Office Visit    1 year ago Peyronie disease    [facility], [facility] Bertrand Alejandro MD    Office Visit          Future Appointments         Provider Department Appt Notes    In 3 months Link Villegas MD [facility], [address] [place] [address] f/u

## 2025-01-15 DIAGNOSIS — E11.9 TYPE 2 DIABETES MELLITUS WITHOUT COMPLICATION, WITHOUT LONG-TERM CURRENT USE OF INSULIN (HCC): ICD-10-CM

## 2025-01-20 RX ORDER — ROSUVASTATIN CALCIUM 10 MG/1
10 TABLET, COATED ORAL NIGHTLY
Qty: 90 TABLET | Refills: 3 | Status: SHIPPED | OUTPATIENT
Start: 2025-01-20

## 2025-01-20 NOTE — TELEPHONE ENCOUNTER
Refill Per Protocol     Requested Prescriptions   Pending Prescriptions Disp Refills    ROSUVASTATIN 10 MG Oral Tab [Pharmacy Med Name: ROSUVASTATIN CALCIUM 10 MG TAB] 90 tablet 1     Sig: TAKE 1 TABLET BY MOUTH EVERY DAY AT NIGHT       Cholesterol Medication Protocol Passed - 1/20/2025 12:23 PM        Passed - ALT < 80     Lab Results   Component Value Date    ALT 30 09/19/2024             Passed - ALT resulted within past year        Passed - Lipid panel within past 12 months     Lab Results   Component Value Date    CHOLEST 115 09/19/2024    TRIG 60 09/19/2024    HDL 41 09/19/2024    LDL 61 09/19/2024    VLDL 9 09/19/2024    TCHDLRATIO 3.54 04/07/2018    NONHDLC 74 09/19/2024             Passed - In person appointment or virtual visit in the past 12 mos or appointment in next 3 mos     Recent Outpatient Visits              4 months ago Wellness examination    [address] Link Tovar MD    Office Visit    8 months ago Type 2 diabetes mellitus without complication, without long-term current use of insulin (HCC)    [address] Link Tovar MD    Office Visit    1 year ago Preoperative examination    [address] Link Tovar MD    Office Visit    1 year ago Wellness examination    [address] Link Tovar MD    Office Visit    1 year ago [facility]          Future Appointments         Provider Department Appt Notes    In 1 month Link Villegas MD [address] 6mo f/u                    Passed - Medication is active on med list               Future Appointments         Provider Department Appt Notes    In 1 month Link Villegas MD [address] 6mo f/u           Recent Outpatient Visits              4 months ago Wellness examination    [facility], [address] [place], [place] [address]Tejas Michael, MD    Office Visit    8 months ago Type 2 diabetes mellitus without complication, without long-term current use of insulin (HCC)    [facility], [address] [place], [place] [address]Tejas Michael, MD    Office Visit    1 year ago Preoperative examination    [facility], [address] [place], [place] [address]Tejas Michael, MD    Office Visit    1 year ago Wellness examination    [facility], [address] [place] [address], Link Tovar MD    Office Visit    1 year ago Peyronie disease    [facility], [facility] Bertrand Alejandro MD    Office Visit

## 2025-01-23 DIAGNOSIS — N52.9 ERECTILE DYSFUNCTION, UNSPECIFIED ERECTILE DYSFUNCTION TYPE: ICD-10-CM

## 2025-01-29 RX ORDER — SILDENAFIL 50 MG/1
TABLET, FILM COATED ORAL
Qty: 6 TABLET | Refills: 1 | Status: SHIPPED | OUTPATIENT
Start: 2025-01-29

## 2025-01-29 NOTE — TELEPHONE ENCOUNTER
REFILL PASSED PER [facility] PROTOCOLS    Requested Prescriptions   Pending Prescriptions Disp Refills    SILDENAFIL CITRATE 50 MG Oral Tab [Pharmacy Med Name: SILDENAFIL 50 MG TABLET] 6 tablet 1     Sig: TAKE 1 TABLET BY MOUTH DAILY AS NEEDED FOR ERECTILE DYSFUNCTION       Genitourinary Medications Passed - 1/29/2025 11:52 AM        Passed - Patient does not have pulmonary hypertension on problem list        Passed - In person appointment or virtual visit in the past 12 mos or appointment in next 3 mos     Recent Outpatient Visits              4 months ago Wellness examination    [address]    Office Visit    8 months ago Type 2 diabetes mellitus without complication, without long-term current use of insulin (HCC)    [address]    Office Visit    1 year ago Preoperative examination    [address]    Office Visit    1 year ago Wellness examination    [address]    Office Visit    1 year ago Peyronie disease    [facility], [facility] Bertrand Alejandro MD    Office Visit          Future Appointments         Provider Department Appt Notes    In 1 month Link Villegas MD [address] 6mo f/u                    Passed - Medication is active on med list             Future Appointments         Provider Department Appt Notes    In 1 month Link Villegas MD [address] 6mo f/u          Recent Outpatient Visits              4 months ago Wellness examination    [address]    Office Visit    8 months ago Type 2 diabetes mellitus without complication, without long-term current use of insulin (HCC)     [facility], [address] [place] [address], Link Tovar MD    Office Visit    1 year ago Preoperative examination    [facility], [address] [place] [address], Link Tovar MD    Office Visit    1 year ago Wellness examination    [facility], [address] [place] [address], Link Tovar MD    Office Visit    1 year ago Peyronie disease    [facility], [facility] Bertrand Alejandro MD    Office Visit

## 2025-03-17 ENCOUNTER — TELEPHONE (OUTPATIENT)
Dept: INTERNAL MEDICINE CLINIC | Facility: CLINIC | Age: 70
End: 2025-03-17

## 2025-03-17 NOTE — TELEPHONE ENCOUNTER
Patient was scheduled for an appointment today.    Appointment was a No Show      Letter mailed to home address on file.

## 2025-03-18 ENCOUNTER — TELEPHONE (OUTPATIENT)
Dept: INTERNAL MEDICINE CLINIC | Facility: CLINIC | Age: 70
End: 2025-03-18

## 2025-03-18 DIAGNOSIS — B35.1 ONYCHOMYCOSIS: Primary | ICD-10-CM

## 2025-03-18 DIAGNOSIS — M54.16 LUMBAR RADICULITIS: ICD-10-CM

## 2025-03-18 NOTE — TELEPHONE ENCOUNTER
Patient stated last year he had a fungus in toe nails and now it's back.  Patient stated the person he saw for it didn't really help him.      Patient requesting Dr. Link Villegas call him and added he forgot to mention this at his appt that he has yesterday with Dr. Link Villegas.

## 2025-03-19 NOTE — TELEPHONE ENCOUNTER
Patient requesting a referral for toe nail fungus  LOV 9/16/24    No show appointment on 3/17/25    Please advise if ok for referral for patient

## 2025-03-19 NOTE — TELEPHONE ENCOUNTER
I called patient to schedule appointment and he can only come in after 4:30-nothing available w/Dr. Link Villegas or mid levels soon at that time    Patient requesting name of specialist to see for this fungus issue-please call him back with the info.

## 2025-03-21 RX ORDER — CYCLOBENZAPRINE HCL 10 MG
10 TABLET ORAL NIGHTLY PRN
Qty: 10 TABLET | Refills: 0 | Status: SHIPPED | OUTPATIENT
Start: 2025-03-21

## 2025-03-21 NOTE — TELEPHONE ENCOUNTER
Patient is scheduled for follow up on 4/17/25. Referral information sent to patient via [employer].

## 2025-03-27 ENCOUNTER — OFFICE VISIT (OUTPATIENT)
Dept: INTERNAL MEDICINE CLINIC | Facility: CLINIC | Age: 70
End: 2025-03-27
Payer: COMMERCIAL

## 2025-03-27 VITALS
TEMPERATURE: 98 F | DIASTOLIC BLOOD PRESSURE: 68 MMHG | OXYGEN SATURATION: 95 % | SYSTOLIC BLOOD PRESSURE: 130 MMHG | WEIGHT: 161.81 LBS | BODY MASS INDEX: 25.7 KG/M2 | HEIGHT: 66.73 IN | RESPIRATION RATE: 18 BRPM | HEART RATE: 79 BPM

## 2025-03-27 DIAGNOSIS — B35.1 ONYCHOMYCOSIS: ICD-10-CM

## 2025-03-27 DIAGNOSIS — R97.20 ELEVATED PSA: ICD-10-CM

## 2025-03-27 DIAGNOSIS — E11.9 TYPE 2 DIABETES MELLITUS WITHOUT COMPLICATION, WITHOUT LONG-TERM CURRENT USE OF INSULIN (HCC): Primary | ICD-10-CM

## 2025-03-27 PROCEDURE — 3075F SYST BP GE 130 - 139MM HG: CPT | Performed by: FAMILY MEDICINE

## 2025-03-27 PROCEDURE — 99214 OFFICE O/P EST MOD 30 MIN: CPT | Performed by: FAMILY MEDICINE

## 2025-03-27 PROCEDURE — 3008F BODY MASS INDEX DOCD: CPT | Performed by: FAMILY MEDICINE

## 2025-03-27 PROCEDURE — 3078F DIAST BP <80 MM HG: CPT | Performed by: FAMILY MEDICINE

## 2025-03-27 NOTE — PROGRESS NOTES
Shaheen Maharaj  8/9/1955    Chief Complaint   Patient presents with    Follow - Up     Six month diabetic follow-up    Fungus Nails     Bilateral fungus to toenail       HPI:   Shaheen Maharaj is a 69 year old male who presents for follow-up. Has been consistent with his current treatment. He is concerned about nail fungus and was not able to set up podiatry appt. Saw a podiatrist years ago and was given recommendations for a topical treatment that was ineffective.     Current Outpatient Medications   Medication Sig Dispense Refill    cyclobenzaprine 10 MG Oral Tab TAKE 1 TABLET BY MOUTH EVERY DAY NIGHTLY AS NEEDED 10 tablet 0    Sildenafil Citrate 50 MG Oral Tab TAKE 1 TABLET BY MOUTH DAILY AS NEEDED FOR ERECTILE DYSFUNCTION 6 tablet 1    rosuvastatin 10 MG Oral Tab Take 1 tablet (10 mg total) by mouth nightly. 90 tablet 3    Glucose Blood (ONETOUCH VERIO) In Vitro Strip 1 strip by In Vitro route daily. 100 strip 3    metFORMIN  MG Oral Tablet 24 Hr Take 2 tablets (1,000 mg total) by mouth daily with breakfast. 180 tablet 3    Blood Glucose Monitoring Suppl (ONETOUCH VERIO FLEX SYSTEM) w/Device Does not apply Kit Use to test blood sugars once daily 1 kit 0    Fexofenadine HCl 180 MG Oral Tab Take 1 tablet (180 mg total) by mouth daily.      Omega-3-acid Ethyl Esters 1 g Oral Cap Take 1 capsule (1 g total) by mouth daily.      Cinnamon 500 MG Oral Cap Take 500 mg by mouth daily. (Patient not taking: Reported on 1/4/2024)      Garlic 500 MG Oral Cap Take 1 capsule by mouth daily. (Patient not taking: Reported on 1/4/2024)      Coenzyme Q10 (COQ-10) 100 MG Oral Cap Take 1 capsule by mouth daily. (Patient not taking: Reported on 1/4/2024)      Multiple Vitamin Oral Tab Take 1 tablet by mouth daily.      Ascorbic Acid (VITAMIN C) 1000 MG Oral Tab Take 1 tablet (1,000 mg total) by mouth daily.      Cholecalciferol (VITAMIN D3) 25 MCG (1000 UT) Oral Cap Take 1 tablet by mouth daily.        Allergies[1]   Past Medical  History:    Allergic rhinitis    Summer grass    Depression    Daughter passed away 2021    Diabetes (HCC)    Hyperlipidemia    Not sure    Visual impairment    reading glasses      Patient Active Problem List   Diagnosis    Left inguinal hernia    Type 2 diabetes mellitus without complication, without long-term current use of insulin (HCC)    Peyronie disease      Past Surgical History:   Procedure Laterality Date    Colonoscopy  2008 and 2019    Polyps removed both times    Hernia surgery  8/2020    Other      ear surgery x 3    Other surgical history  2010    Right arm fracture with surgery      Family History   Problem Relation Age of Onset    Stroke Mother     Heart Disease Mother     Cancer Neg       Social History     Socioeconomic History    Marital status:    Tobacco Use    Smoking status: Never    Smokeless tobacco: Never   Vaping Use    Vaping status: Never Used   Substance and Sexual Activity    Alcohol use: Yes     Alcohol/week: 2.0 - 22.0 standard drinks of alcohol     Types: 1 - 2 Glasses of wine, 1 - 20 Cans of beer per week     Comment: social/weekend once per month    Drug use: Never    Sexual activity: Yes   Other Topics Concern    Caffeine Concern No    Exercise No    Seat Belt No    Special Diet No    Stress Concern No    Weight Concern No         REVIEW OF SYSTEMS:   GENERAL: feels well otherwise, no recent illness.     EXAM:   /68 (BP Location: Left arm, Patient Position: Sitting, Cuff Size: adult)   Pulse 79   Temp 97.8 °F (36.6 °C) (Temporal)   Resp 18   Ht 5' 6.73\" (1.695 m)   Wt 161 lb 12.8 oz (73.4 kg)   SpO2 95%   BMI 25.55 kg/m²   GENERAL: Well developed, well nourished,in no apparent distress  EYES: PERRLA, EOMI, conjunctiva are clear  HEENT: atraumatic, normocephalic  LUNGS: clear to auscultation  CARDIO: RRR without murmur  Bilateral barefoot skin diabetic exam is abnormal with dystrophic nails and/or dry skin     ASSESSMENT AND PLAN:   [facility] is a 69 year  old male who presents with for follow-up    Type 2 diabetes mellitus without complication  Will continue current treatment for now. Labs to be completed in the next week. Needs to schedule eye exam.    - CMP Now; Future  - Lipids Now; Future  - Hemoglobin A1C Now; Future  - Microalb/Creat Ratio, Random Urine Now; Future    Onychomycosis  LFTs pending, if normal, will begin terbinafine.     Elevated PSA  PSA elevated on last check. Did not see Dr. Alejandro which he needs to do. Repeat PSA ordered.   - PSA, Total - Diagnostic [E]; Future    All questions were answered and the patient agrees with the plan.     Thank you,  Link Villegas MD         [1]   Allergies  Allergen Reactions    Pollen OTHER (SEE COMMENTS)     sneezing

## 2025-03-31 ENCOUNTER — LAB ENCOUNTER (OUTPATIENT)
Dept: LAB | Facility: HOSPITAL | Age: 70
End: 2025-03-31
Attending: FAMILY MEDICINE
Payer: COMMERCIAL

## 2025-03-31 DIAGNOSIS — R97.20 ELEVATED PSA: ICD-10-CM

## 2025-03-31 DIAGNOSIS — E11.9 TYPE 2 DIABETES MELLITUS WITHOUT COMPLICATION, WITHOUT LONG-TERM CURRENT USE OF INSULIN (HCC): ICD-10-CM

## 2025-03-31 LAB
ALBUMIN SERPL-MCNC: 4.7 G/DL (ref 3.2–4.8)
ALBUMIN/GLOB SERPL: 1.8 {RATIO} (ref 1–2)
ALP LIVER SERPL-CCNC: 73 U/L
ALT SERPL-CCNC: 28 U/L
ANION GAP SERPL CALC-SCNC: 9 MMOL/L (ref 0–18)
AST SERPL-CCNC: 23 U/L (ref ?–34)
BILIRUB SERPL-MCNC: 0.9 MG/DL (ref 0.2–1.1)
BUN BLD-MCNC: 11 MG/DL (ref 9–23)
CALCIUM BLD-MCNC: 9.5 MG/DL (ref 8.7–10.6)
CHLORIDE SERPL-SCNC: 103 MMOL/L (ref 98–112)
CHOLEST SERPL-MCNC: 120 MG/DL (ref ?–200)
CO2 SERPL-SCNC: 28 MMOL/L (ref 21–32)
CREAT BLD-MCNC: 1.17 MG/DL
CREAT UR-SCNC: 135 MG/DL
EGFRCR SERPLBLD CKD-EPI 2021: 68 ML/MIN/1.73M2 (ref 60–?)
EST. AVERAGE GLUCOSE BLD GHB EST-MCNC: 166 MG/DL (ref 68–126)
FASTING PATIENT LIPID ANSWER: YES
FASTING STATUS PATIENT QL REPORTED: YES
GLOBULIN PLAS-MCNC: 2.6 G/DL (ref 2–3.5)
GLUCOSE BLD-MCNC: 119 MG/DL (ref 70–99)
HBA1C MFR BLD: 7.4 % (ref ?–5.7)
HDLC SERPL-MCNC: 38 MG/DL (ref 40–59)
LDLC SERPL CALC-MCNC: 70 MG/DL (ref ?–100)
MICROALBUMIN UR-MCNC: 0.3 MG/DL
MICROALBUMIN/CREAT 24H UR-RTO: 2.2 UG/MG (ref ?–30)
NONHDLC SERPL-MCNC: 82 MG/DL (ref ?–130)
OSMOLALITY SERPL CALC.SUM OF ELEC: 291 MOSM/KG (ref 275–295)
POTASSIUM SERPL-SCNC: 4.5 MMOL/L (ref 3.5–5.1)
PROT SERPL-MCNC: 7.3 G/DL (ref 5.7–8.2)
PSA SERPL-MCNC: 3.07 NG/ML (ref ?–4)
SODIUM SERPL-SCNC: 140 MMOL/L (ref 136–145)
TRIGL SERPL-MCNC: 50 MG/DL (ref 30–149)
VLDLC SERPL CALC-MCNC: 8 MG/DL (ref 0–30)

## 2025-03-31 PROCEDURE — 83036 HEMOGLOBIN GLYCOSYLATED A1C: CPT

## 2025-03-31 PROCEDURE — 82043 UR ALBUMIN QUANTITATIVE: CPT

## 2025-03-31 PROCEDURE — 80053 COMPREHEN METABOLIC PANEL: CPT

## 2025-03-31 PROCEDURE — 84153 ASSAY OF PSA TOTAL: CPT

## 2025-03-31 PROCEDURE — 82570 ASSAY OF URINE CREATININE: CPT

## 2025-03-31 PROCEDURE — 36415 COLL VENOUS BLD VENIPUNCTURE: CPT

## 2025-03-31 PROCEDURE — 80061 LIPID PANEL: CPT

## 2025-04-02 ENCOUNTER — TELEPHONE (OUTPATIENT)
Dept: INTERNAL MEDICINE CLINIC | Facility: CLINIC | Age: 70
End: 2025-04-02

## 2025-04-02 DIAGNOSIS — B35.1 ONYCHOMYCOSIS: Primary | ICD-10-CM

## 2025-04-02 DIAGNOSIS — E11.9 TYPE 2 DIABETES MELLITUS WITHOUT COMPLICATION, WITHOUT LONG-TERM CURRENT USE OF INSULIN (HCC): Primary | ICD-10-CM

## 2025-04-02 RX ORDER — TERBINAFINE HYDROCHLORIDE 250 MG/1
250 TABLET ORAL DAILY
Qty: 90 TABLET | Refills: 0 | Status: SHIPPED | OUTPATIENT
Start: 2025-04-02 | End: 2025-07-01

## 2025-04-03 NOTE — TELEPHONE ENCOUNTER
[facility] Kate Garcia ([phone])called stating the prior auth was denied because no test results were sent in or they were not positive. They are asking for anymore information we can send them?    Fax to [phone]

## 2025-04-03 NOTE — TELEPHONE ENCOUNTER
Spoke to Mary at [facility] to advise that patient was referred to Podiatry, there is no additional information to be sent from this office. Mary verbalizes understanding

## 2025-04-03 NOTE — TELEPHONE ENCOUNTER
Spoke to pt. Informed him that PA for terbinafine was denied by his insurance. Dr. Villegas recommended evaluation with podiatry. A referral was entered for Mitch Yung [phone]. Pt voiced understanding.

## 2025-04-04 NOTE — TELEPHONE ENCOUNTER
Patient called to get clarification on denial of medication. Advised that he would need to see Podiatry as recommended by Dr. Villegas and that the Podiatrist would have further documentation to further support the need for this medication. Patient verbalizes understanding.

## 2025-05-01 DIAGNOSIS — B35.1 ONYCHOMYCOSIS: ICD-10-CM

## 2025-05-02 RX ORDER — TERBINAFINE HYDROCHLORIDE 250 MG/1
TABLET ORAL
Qty: 90 TABLET | Refills: 0 | OUTPATIENT
Start: 2025-05-02

## 2025-05-02 NOTE — TELEPHONE ENCOUNTER
Disp Refills Start End    terbinafine 250 MG Oral Tab 90 tablet 0 4/2/2025 7/1/2025    Sig - Route: Take 1 tablet (250 mg total) by mouth daily. - Oral    Sent to pharmacy as: Terbinafine HCl 250 MG Oral Tablet (Lamisil)    E-Prescribing Status: Receipt confirmed by pharmacy (4/2/2025  2:18 PM CDT)    Prior authorization: Denied      Associated Diagnoses    Onychomycosis  - Primary        Pharmacy    [facility]/PHARMACY #[id number] - [place], [place] - [address] [phone], [phone]

## 2025-05-21 ENCOUNTER — OFFICE VISIT (OUTPATIENT)
Dept: PODIATRY CLINIC | Facility: CLINIC | Age: 70
End: 2025-05-21

## 2025-05-21 DIAGNOSIS — L60.3 NAIL DYSTROPHY: ICD-10-CM

## 2025-05-21 DIAGNOSIS — B35.1 ONYCHOMYCOSIS: Primary | ICD-10-CM

## 2025-05-21 DIAGNOSIS — E11.9 TYPE 2 DIABETES MELLITUS WITHOUT COMPLICATION, WITHOUT LONG-TERM CURRENT USE OF INSULIN (HCC): ICD-10-CM

## 2025-05-21 PROCEDURE — 99203 OFFICE O/P NEW LOW 30 MIN: CPT | Performed by: STUDENT IN AN ORGANIZED HEALTH CARE EDUCATION/TRAINING PROGRAM

## 2025-05-23 NOTE — PROGRESS NOTES
[facility] Podiatry  Progress Note    Shaheen Maharaj is a 69 year old male.   Chief Complaint   Patient presents with    Consult     Toenail issues         HPI:     Patient is a pleasant 69-year-old diabetic male who presents to clinic for evaluation of possible fungal nails.  He has thickened and dystrophic nails and is wondering what treatment options are available.  Patient's diabetes is well-controlled and his most recent hemoglobin A1c was 7.4% on 3/31/2025.  No other complaints are mentioned.      Allergies: Pollen   Current Medications[1]   Past Medical History[2]   Past Surgical History[3]   Family History[4]   Social Hx on file[5]        REVIEW OF SYSTEMS:     No n/v/f/c.      EXAM:   There were no vitals taken for this visit.  GENERAL: well developed, well nourished, in no apparent distress  EXTREMITIES:             1. Integument: Normal skin temperature and turgor.  Nails x 10 are elongated, thickened, dystrophic, and with subungual debris.  2. Vascular: Pedal pulses palpable.   3. Musculoskeletal: All muscle groups are graded 5 out of 5 in the foot and ankle.  Flexion contracture of lesser digits.   4. Neurological: Normal sharp dull sensation; reflexes normal.  Gross sensation intact to digits via light touch.      Bilateral barefoot skin diabetic exam is abnormal with dystrophic nails and/or dry skin         ASSESSMENT AND PLAN:   Diagnoses and all orders for this visit:    Onychomycosis  -     Dermatophyte Only, Culture [E]; Future    Nail dystrophy    Type 2 diabetes mellitus without complication, without long-term current use of insulin ([facility])        Plan:    -Patient examined, chart history reviewed.  -Discussed importance of proper pedal hygiene, regular foot checks, and tight glucose control.  -Sharply debrided nails with a sterile nail nipper, without incident. Nails further smoothed with dremel.  Nail biopsy obtained to check for fungus.  Pending results would likely consider oral  Lamisil.  -Ambulate with supportive shoes and avoid walking barefoot.  -Educated patient on acute signs of infection advised patient to seek immediate medical attention if symptoms arise.     The patient indicates understanding of these issues and agrees to the plan.    RTC 3 to 4 months for evaluation.  Will reach out with nail biopsy results and discuss next steps.    Mitch Yung DPM    Dragon speech recognition software was used to prepare this note.  Errors in word recognition may occur.  Please contact me with any questions/concerns with this note.         [1]   Current Outpatient Medications   Medication Sig Dispense Refill    terbinafine 250 MG Oral Tab Take 1 tablet (250 mg total) by mouth daily. 90 tablet 0    cyclobenzaprine 10 MG Oral Tab TAKE 1 TABLET BY MOUTH EVERY DAY NIGHTLY AS NEEDED 10 tablet 0    Sildenafil Citrate 50 MG Oral Tab TAKE 1 TABLET BY MOUTH DAILY AS NEEDED FOR ERECTILE DYSFUNCTION 6 tablet 1    rosuvastatin 10 MG Oral Tab Take 1 tablet (10 mg total) by mouth nightly. 90 tablet 3    Glucose Blood (ONETOUCH VERIO) In Vitro Strip 1 strip by In Vitro route daily. 100 strip 3    metFORMIN  MG Oral Tablet 24 Hr Take 2 tablets (1,000 mg total) by mouth daily with breakfast. 180 tablet 3    Blood Glucose Monitoring Suppl (ONETOUCH VERIO FLEX SYSTEM) w/Device Does not apply Kit Use to test blood sugars once daily 1 kit 0    Fexofenadine HCl 180 MG Oral Tab Take 1 tablet (180 mg total) by mouth daily.      Omega-3-acid Ethyl Esters 1 g Oral Cap Take 1 capsule (1 g total) by mouth daily.      Cinnamon 500 MG Oral Cap Take 500 mg by mouth daily.      Coenzyme Q10 (COQ-10) 100 MG Oral Cap Take 1 capsule by mouth daily.      Multiple Vitamin Oral Tab Take 1 tablet by mouth daily.      Ascorbic Acid (VITAMIN C) 1000 MG Oral Tab Take 1 tablet (1,000 mg total) by mouth daily.      Cholecalciferol (VITAMIN D3) 25 MCG (1000 UT) Oral Cap Take 1 tablet by mouth daily.      Garlic 500 MG Oral Cap Take  1 capsule by mouth daily. (Patient not taking: Reported on 5/21/2025)     [2]   Past Medical History:   Allergic rhinitis    Summer grass    Depression    Daughter passed away 2021    Diabetes (HCC)    Hyperlipidemia    Not sure    Visual impairment    reading glasses   [3]   Past Surgical History:  Procedure Laterality Date    Colonoscopy  2008 and 2019    Polyps removed both times    Hernia surgery  8/2020    Other      ear surgery x 3    Other surgical history  2010    Right arm fracture with surgery   [4]   Family History  Problem Relation Age of Onset    Stroke Mother     Heart Disease Mother     Cancer Neg    [5]   Social History  Socioeconomic History    Marital status:    Tobacco Use    Smoking status: Never    Smokeless tobacco: Never   Vaping Use    Vaping status: Never Used   Substance and Sexual Activity    Alcohol use: Yes     Alcohol/week: 2.0 - 22.0 standard drinks of alcohol     Types: 1 - 2 Glasses of wine, 1 - 20 Cans of beer per week     Comment: social/weekend once per month    Drug use: Never    Sexual activity: Yes   Other Topics Concern    Caffeine Concern No    Exercise No    Seat Belt No    Special Diet No    Stress Concern No    Weight Concern No

## 2025-06-23 ENCOUNTER — PATIENT MESSAGE (OUTPATIENT)
Dept: PODIATRY CLINIC | Facility: CLINIC | Age: 70
End: 2025-06-23

## 2025-06-23 DIAGNOSIS — B35.1 ONYCHOMYCOSIS: Primary | ICD-10-CM

## 2025-06-24 RX ORDER — TERBINAFINE HYDROCHLORIDE 250 MG/1
250 TABLET ORAL DAILY
Qty: 90 TABLET | Refills: 0 | Status: SHIPPED | OUTPATIENT
Start: 2025-06-24

## 2025-07-05 DIAGNOSIS — N52.9 ERECTILE DYSFUNCTION, UNSPECIFIED ERECTILE DYSFUNCTION TYPE: ICD-10-CM

## 2025-07-05 DIAGNOSIS — B35.1 ONYCHOMYCOSIS: ICD-10-CM

## 2025-07-08 RX ORDER — SILDENAFIL 50 MG/1
50 TABLET, FILM COATED ORAL DAILY PRN
Qty: 6 TABLET | Refills: 1 | Status: SHIPPED | OUTPATIENT
Start: 2025-07-08

## 2025-07-08 RX ORDER — TERBINAFINE HYDROCHLORIDE 250 MG/1
TABLET ORAL
Qty: 90 TABLET | Refills: 0 | OUTPATIENT
Start: 2025-07-08

## 2025-07-08 NOTE — TELEPHONE ENCOUNTER
Outpatient Medication Detail     Disp Refills Start End    terbinafine 250 MG Oral Tab 90 tablet 0 6/24/2025 --    Sig - Route: Take 1 tablet (250 mg total) by mouth daily. - Oral    Prior authorization: Payer Waiting for Response    This order has not been released to its destination.      Associated Diagnoses    Onychomycosis  - Primary        Pharmacy    [facility]/PHARMACY #[id number] - [place], [place] - [address] [phone], [phone]

## 2025-07-10 ENCOUNTER — TELEPHONE (OUTPATIENT)
Dept: ORTHOPEDICS CLINIC | Facility: CLINIC | Age: 70
End: 2025-07-10

## 2025-07-10 DIAGNOSIS — B35.1 ONYCHOMYCOSIS: ICD-10-CM

## 2025-07-10 NOTE — TELEPHONE ENCOUNTER
Patient needs prior auth for terbinafine 250 MG Oral Tab. Insurance number is [id number]. Please advise

## 2025-07-11 RX ORDER — TERBINAFINE HYDROCHLORIDE 250 MG/1
250 TABLET ORAL DAILY
Qty: 90 TABLET | Refills: 0 | Status: CANCELLED | OUTPATIENT
Start: 2025-07-11

## 2025-07-11 NOTE — TELEPHONE ENCOUNTER
Called pharmacy- They provided me PA number for [employer] at [phone]    Called [employer]- Provided information for for PA- They were able to provide me authorization of #[id number], 7/10/25-10/10/25.    Left message to call back    [employer] sent to patient

## (undated) DEVICE — STERILE POLYISOPRENE POWDER-FREE SURGICAL GLOVES: Brand: PROTEXIS

## (undated) DEVICE — FENESTRATED BIPOLAR FORCEPS: Brand: ENDOWRIST

## (undated) DEVICE — SPONGE STICK WITH PVP-I: Brand: KENDALL

## (undated) DEVICE — VIOLET BRAIDED (POLYGLACTIN 910), SYNTHETIC ABSORBABLE SUTURE: Brand: COATED VICRYL

## (undated) DEVICE — SUTURE MONOCRYL 4-0 PS-2

## (undated) DEVICE — 40580 - THE PINK PAD - ADVANCED TRENDELENBURG POSITIONING KIT: Brand: 40580 - THE PINK PAD - ADVANCED TRENDELENBURG POSITIONING KIT

## (undated) DEVICE — SUTURE VLOC 90 2-0 9\" 2145

## (undated) DEVICE — ROBOTIC GENERAL: Brand: MEDLINE INDUSTRIES, INC.

## (undated) DEVICE — BLADELESS OBTURATOR: Brand: WECK VISTA

## (undated) DEVICE — TROCAR: Brand: KII SHIELDED BLADED ACCESS SYSTEM

## (undated) DEVICE — ENDOPATH ULTRA VERESS INSUFFLATION NEEDLES WITH LUER LOCK CONNECTORS: Brand: ENDOPATH

## (undated) DEVICE — SUTURE VLOC 90 3-0 9\" 0644

## (undated) DEVICE — TIP COVER ACCESSORY

## (undated) DEVICE — COVER WAND RF DETECT

## (undated) DEVICE — Device

## (undated) DEVICE — GAUZE SPONGES,USP TYPE VII GAUZE, 12 PLY: Brand: CURITY

## (undated) DEVICE — VISUALIZATION SYSTEM: Brand: CLEARIFY

## (undated) DEVICE — 3M(TM) TEGADERM(TM) TRANSPARENT FILM DRESSING FRAME STYLE 9505W: Brand: 3M™ TEGADERM™

## (undated) DEVICE — CANNULA SEAL

## (undated) DEVICE — ARM DRAPE

## (undated) DEVICE — MEGA SUTURECUT ND: Brand: ENDOWRIST

## (undated) DEVICE — MONOPOLAR CURVED SCISSORS: Brand: ENDOWRIST

## (undated) DEVICE — COLUMN DRAPE

## (undated) NOTE — LETTER
10/26/17    Patient: Carline Carr  : 1955 Visit date: 10/26/2017    Dear  Dr. Mirtha Armendariz MD,    Thank you for referring Carline Carr to my practice. Please find my assessment and plan below.                 Assessment   Left inguinal hernia  (primar

## (undated) NOTE — LETTER
August 24, 2017    Meme Chiu  [address]  [place] [address]      Dear Raisa Olivaresist: The following are the results of your recent tests. Please review the list of test results.   Your result is the value on the left; we have also supplied the range of n Eosinophil Absolute 0.21 0.00 - 0.30 x10(3) uL   Basophil Absolute 0.05 0.00 - 0.10 x10(3) uL   Immature Granulocyte Absolute 0.07 0.00 - 1.00 x10(3) uL   Neutrophil % 58.2 %   Lymphocyte % 28.2 %   Monocyte % 9.0 %   Eosinophil % 2.9 %   Basophil % 0.7 %

## (undated) NOTE — LETTER
2020    Return to Work    Name: Savanna Mosqueda        : 1955    To Whom It May Concern,    Savanna Mosqueda had surgery on 2020 and is:    Able to return to work on 2020 with no restrictions.      Comments:    If there are any further

## (undated) NOTE — LETTER
3/17/2025    Shaheen [facility]  [address] [place] [address]    Dear Shaheen,    We would like to inform you that your account has been charged $40 for not showing up to the office for your scheduled appointment on 3/17/2025.    Our no-show policy is as follows: A 24-hour notice is required, or you may be charged a $40 No Show fee.      If you are unable to keep your scheduled appointment, please notify us at least 24 hours in advance so we can accommodate our other patients. You may also reschedule your appointment at that time.    On the third no-show, within a 12-month period, it will be the physician’s discretion as to whether a discharge letter will be sent out disengaging you from the practice and giving you 30 days to enroll with a new non [facility] physician.    If you would like to contest this charge, please call [phone].    Sincerely,  [facility]

## (undated) NOTE — LETTER
January 17, 2019    [place] [address]  [address] [facility] [address]      Dear Garrison [place]: The following are the results of your recent tests. Please review the list of test results.   Your result is the value on the left; we have also supplied the rang WBC 7.1 4.0 - 13.0 x10(3) uL    RBC 4.78 4.30 - 5.70 x10(6)uL    HGB 14.1 13.0 - 17.0 g/dL    HCT 43.3 37.0 - 53.0 %    .0 150.0 - 450.0 10(3)uL    MCV 90.6 80.0 - 99.0 fL    MCH 29.5 27.0 - 33.2 pg    MCHC 32.6 31.0 - 37.0 g/dL    RDW 12.6 11.5 -

## (undated) NOTE — LETTER
[address] [address] PIETER ORTEZ  [facility] [phone]     Patient: Michi Avina   YOB: 1955   Date of Visit: 11/9/2020     Dear Employer,        November 9, 2020    At [facility], we are taking sp ? At least 10 days (20 days for those with severe illness requiring hospitalization) have passed since symptom onset and  ? At least 24 hours have passed since resolution of fever without the use of fever-reducing medications and  ?  Other symptoms have imp

## (undated) NOTE — LETTER
20    Patient: Ankit Busby  : 1955 Visit date: 2020    Dear  Dr. Petey Barlow MD,    Thank you for referring Ankit Busby to my practice. Please find my assessment and plan below.            Assessment   Left inguinal hernia  (primary encou

## (undated) NOTE — LETTER
10/25/18        Jj Beth  [address] [facility] [address]      Dear Milton Lo,    [address] records indicate that you have outstanding FASTING lab work and or testing that was ordered for you and has not yet been completed:  Orders Placed This Encounter